# Patient Record
Sex: MALE | Race: WHITE | Employment: UNEMPLOYED | RURAL
[De-identification: names, ages, dates, MRNs, and addresses within clinical notes are randomized per-mention and may not be internally consistent; named-entity substitution may affect disease eponyms.]

---

## 2018-10-09 ENCOUNTER — APPOINTMENT (OUTPATIENT)
Dept: CT IMAGING | Facility: HOSPITAL | Age: 53
End: 2018-10-09
Payer: COMMERCIAL

## 2018-10-09 ENCOUNTER — HOSPITAL ENCOUNTER (EMERGENCY)
Facility: HOSPITAL | Age: 53
Discharge: HOME | End: 2018-10-09
Attending: EMERGENCY MEDICINE
Payer: COMMERCIAL

## 2018-10-09 VITALS
OXYGEN SATURATION: 96 % | HEIGHT: 71 IN | TEMPERATURE: 98.1 F | BODY MASS INDEX: 26.6 KG/M2 | SYSTOLIC BLOOD PRESSURE: 143 MMHG | DIASTOLIC BLOOD PRESSURE: 98 MMHG | RESPIRATION RATE: 16 BRPM | HEART RATE: 63 BPM | WEIGHT: 190 LBS

## 2018-10-09 DIAGNOSIS — R13.10 DYSPHAGIA, UNSPECIFIED TYPE: Primary | ICD-10-CM

## 2018-10-09 PROCEDURE — 70490 CT SOFT TISSUE NECK W/O DYE: CPT

## 2018-10-09 PROCEDURE — 99283 EMERGENCY DEPT VISIT LOW MDM: CPT

## 2018-10-09 RX ORDER — PROPRANOLOL HYDROCHLORIDE 80 MG/1
80 TABLET ORAL 2 TIMES DAILY
COMMUNITY

## 2018-10-09 RX ORDER — HYDROCHLOROTHIAZIDE 12.5 MG/1
12.5 TABLET ORAL DAILY
COMMUNITY

## 2018-10-09 RX ORDER — FAMOTIDINE 40 MG/1
40 TABLET, FILM COATED ORAL DAILY PRN
COMMUNITY

## 2018-10-09 ASSESSMENT — ENCOUNTER SYMPTOMS
TROUBLE SWALLOWING: 0
EYE REDNESS: 0
BACK PAIN: 0
CHOKING: 1
DIARRHEA: 0
NAUSEA: 0
COUGH: 0
ABDOMINAL PAIN: 0
WHEEZING: 0
RHINORRHEA: 0
CONSTIPATION: 0
VOMITING: 0
EYE DISCHARGE: 0
SORE THROAT: 0
SINUS PRESSURE: 0
CHEST TIGHTNESS: 0
EYE PAIN: 0
SHORTNESS OF BREATH: 0

## 2018-10-09 ASSESSMENT — PAIN DESCRIPTION - DESCRIPTORS: DESCRIPTORS: BURNING;OTHER (COMMENT)

## 2018-10-09 ASSESSMENT — PAIN DESCRIPTION - ONSET: ONSET: SUDDEN

## 2018-10-09 ASSESSMENT — PAIN DESCRIPTION - PROGRESSION: CLINICAL_PROGRESSION: NOT CHANGED

## 2018-10-09 ASSESSMENT — PAIN DESCRIPTION - PAIN TYPE: TYPE: ACUTE PAIN

## 2018-10-09 ASSESSMENT — PAIN SCALES - GENERAL
PAINLEVEL_OUTOF10: 0
PAINLEVEL_OUTOF10: 7

## 2018-10-09 ASSESSMENT — PAIN DESCRIPTION - LOCATION: LOCATION: THROAT;CHEST

## 2018-10-09 ASSESSMENT — PAIN DESCRIPTION - FREQUENCY: FREQUENCY: CONTINUOUS

## 2018-10-10 NOTE — ED PROVIDER NOTES
PM      PATIENT REFERRED TO:  [facility]  [facility]  [address]   [facility]  [phone]    If symptoms worsen      DISCHARGE MEDICATIONS:  New Prescriptions    No medications on file         (Please note that portions of this note may have been completed with a voice recognition program.  Efforts were made to edit the dictations but occasionally words are mis-transcribed.)    Omar Mena MD (electronically signed)  Attending Emergency Physician        Renuka Bustamante MD  10/09/18 [id number]

## 2019-04-04 ENCOUNTER — LAB (OUTPATIENT)
Dept: LAB | Facility: HOSPITAL | Age: 54
End: 2019-04-04

## 2019-04-04 ENCOUNTER — TRANSCRIBE ORDERS (OUTPATIENT)
Dept: ADMINISTRATIVE | Facility: HOSPITAL | Age: 54
End: 2019-04-04

## 2019-04-04 DIAGNOSIS — K74.60 HEPATIC CIRRHOSIS, UNSPECIFIED HEPATIC CIRRHOSIS TYPE, UNSPECIFIED WHETHER ASCITES PRESENT (HCC): ICD-10-CM

## 2019-04-04 DIAGNOSIS — K74.60 HEPATIC CIRRHOSIS, UNSPECIFIED HEPATIC CIRRHOSIS TYPE, UNSPECIFIED WHETHER ASCITES PRESENT (HCC): Primary | ICD-10-CM

## 2019-04-04 LAB — AMMONIA BLD-SCNC: 58 UMOL/L (ref 16–60)

## 2019-04-04 PROCEDURE — 36415 COLL VENOUS BLD VENIPUNCTURE: CPT

## 2019-04-04 PROCEDURE — 82140 ASSAY OF AMMONIA: CPT

## 2019-04-07 ENCOUNTER — OFFICE VISIT (OUTPATIENT)
Dept: RETAIL CLINIC | Facility: CLINIC | Age: 54
End: 2019-04-07

## 2019-04-07 VITALS
HEART RATE: 77 BPM | TEMPERATURE: 98.2 F | HEIGHT: 71 IN | WEIGHT: 199 LBS | BODY MASS INDEX: 27.86 KG/M2 | OXYGEN SATURATION: 97 % | RESPIRATION RATE: 20 BRPM | SYSTOLIC BLOOD PRESSURE: 120 MMHG | DIASTOLIC BLOOD PRESSURE: 70 MMHG

## 2019-04-07 DIAGNOSIS — J06.9 ACUTE URI: Primary | ICD-10-CM

## 2019-04-07 LAB
EXPIRATION DATE: NORMAL
FLUAV AG NPH QL: NEGATIVE
FLUBV AG NPH QL: NEGATIVE
INTERNAL CONTROL: NORMAL
Lab: NORMAL

## 2019-04-07 PROCEDURE — 99203 OFFICE O/P NEW LOW 30 MIN: CPT | Performed by: NURSE PRACTITIONER

## 2019-04-07 PROCEDURE — 87804 INFLUENZA ASSAY W/OPTIC: CPT | Performed by: NURSE PRACTITIONER

## 2019-04-07 RX ORDER — ALBUTEROL SULFATE 90 UG/1
2 AEROSOL, METERED RESPIRATORY (INHALATION) EVERY 4 HOURS PRN
Qty: 1 INHALER | Refills: 0 | Status: SHIPPED | OUTPATIENT
Start: 2019-04-07 | End: 2019-05-07

## 2019-04-07 RX ORDER — AZITHROMYCIN 250 MG/1
TABLET, FILM COATED ORAL
Qty: 6 TABLET | Refills: 0 | Status: ON HOLD | OUTPATIENT
Start: 2019-04-07 | End: 2019-05-14

## 2019-04-07 RX ORDER — CLONIDINE HYDROCHLORIDE 0.1 MG/1
TABLET ORAL
Refills: 0 | Status: ON HOLD | COMMUNITY
Start: 2019-04-03 | End: 2019-05-14

## 2019-04-07 RX ORDER — TRIAMCINOLONE ACETONIDE 1 MG/G
CREAM TOPICAL
Refills: 3 | Status: ON HOLD | COMMUNITY
Start: 2019-04-01 | End: 2019-05-14

## 2019-04-07 RX ORDER — PANTOPRAZOLE SODIUM 20 MG/1
TABLET, DELAYED RELEASE ORAL
Refills: 0 | Status: ON HOLD | COMMUNITY
Start: 2019-04-01 | End: 2019-05-14

## 2019-04-07 RX ORDER — HYDROCHLOROTHIAZIDE 12.5 MG/1
CAPSULE, GELATIN COATED ORAL
Refills: 3 | Status: ON HOLD | COMMUNITY
Start: 2019-04-01 | End: 2019-05-14

## 2019-04-07 RX ORDER — PROPRANOLOL HYDROCHLORIDE 80 MG/1
TABLET ORAL
Refills: 3 | Status: ON HOLD | COMMUNITY
Start: 2019-04-01 | End: 2019-05-14

## 2019-04-07 RX ORDER — IBUPROFEN 600 MG/1
TABLET ORAL
Refills: 0 | Status: ON HOLD | COMMUNITY
Start: 2019-04-03 | End: 2019-05-14

## 2019-04-07 RX ORDER — METHYLPREDNISOLONE 4 MG/1
TABLET ORAL
Qty: 1 EACH | Refills: 0 | Status: SHIPPED | OUTPATIENT
Start: 2019-04-07 | End: 2019-04-12

## 2019-04-07 NOTE — PROGRESS NOTES
ROCÍO@  Brandon Dillon is a 53 y.o. male.   Chief Complaint   Patient presents with   • Flu Symptoms     3 days      URI    This is a new problem. The current episode started in the past 7 days (3 days). The problem has been waxing and waning. The maximum temperature recorded prior to his arrival was 103 - 104 F. The fever has been present for 1 to 2 days. Associated symptoms include congestion (head/chest), coughing (productive), headaches and wheezing. Pertinent negatives include no chest pain, ear pain, rash or sore throat. Treatments tried: Mucinex. The treatment provided mild relief.        Brandon Dillon  presents to [facility] with cc of Flu Like symptoms for 3 days. Reviewed the PMFSH. . See ROS.  The following portions of the patient's history were reviewed and updated as appropriate: allergies, current medications, past family history, past medical history, past social history, past surgical history and problem list.    Current Outpatient Medications:   •  albuterol sulfate  (90 Base) MCG/ACT inhaler, Inhale 2 puffs Every 4 (Four) Hours As Needed for Wheezing for up to 30 days., Disp: 1 inhaler, Rfl: 0  •  azithromycin (ZITHROMAX Z-FRANCISCA) 250 MG tablet, Take 2 tablets the first day, then 1 tablet daily for 4 days., Disp: 6 tablet, Rfl: 0  •  CloNIDine (CATAPRES) 0.1 MG tablet, TAKE ONE TABLET BY MOUTH EVERY DAY  HOLD DOSE IF SYSTOLIC BLOOD PRESSURE IS GREATER THAN 170 OR IF HEART RATE IS LESS THAN 60, Disp: , Rfl: 0  •  hydrochlorothiazide (MICROZIDE) 12.5 MG capsule, TAKE ONE CAPSULE BY MOUTH EVERY DAY AS DIRECTED, Disp: , Rfl: 3  •  ibuprofen (ADVIL,MOTRIN) 600 MG tablet, TAKE ONE TABLET BY MOUTH EVERY 12 HOURS FOR PAIN AND INFLAMATION, Disp: , Rfl: 0  •  methylPREDNISolone (MEDROL, FRANCISCA,) 4 MG tablet, Take as directed on package instructions., Disp: 1 each, Rfl: 0  •  pantoprazole (PROTONIX) 20 MG EC tablet, TAKE ONE TABLET BY MOUTH EVERY DAY FOR THE STOMACH, Disp: , Rfl: 0  •  propranolol (INDERAL) 80  "MG tablet, TAKE ONE TABLET BY MOUTH TWO TIMES A DAY AS DIRECTED, Disp: , Rfl: 3  •  triamcinolone (KENALOG) 0.1 % cream, APPLY TO AFFECTED AREA TWO TIMES A DAY AS DIRECTED, Disp: , Rfl: 3    Allergies   Allergen Reactions   • Sulfa Antibiotics Hives, Shortness Of Breath and Swelling       Review of Systems   Constitutional: Positive for fever. Negative for chills and fatigue.   HENT: Positive for congestion (head/chest). Negative for ear pain and sore throat.    Respiratory: Positive for cough (productive), chest tightness and wheezing.    Cardiovascular: Negative for chest pain.   Endocrine: Negative for cold intolerance.   Musculoskeletal: Negative for arthralgias.   Skin: Negative for rash.   Neurological: Positive for headaches.       Objective     Visit Vitals  /70   Pulse 77   Temp 98.2 °F (36.8 °C) (Temporal)   Resp 20   Ht 180.3 cm (71\")   Wt 90.3 kg (199 lb)   SpO2 97%   BMI 27.75 kg/m²         Physical Exam   Constitutional: He is oriented to person, place, and time. He appears well-developed and well-nourished. No distress.   HENT:   Head: Normocephalic and atraumatic.   Right Ear: Tympanic membrane, external ear and ear canal normal.   Left Ear: Tympanic membrane, external ear and ear canal normal.   Nose: Mucosal edema present. Right sinus exhibits no maxillary sinus tenderness and no frontal sinus tenderness. Left sinus exhibits no maxillary sinus tenderness and no frontal sinus tenderness.   Mouth/Throat: Uvula is midline and mucous membranes are normal. Posterior oropharyngeal erythema present. No oropharyngeal exudate. No tonsillar exudate.   Eyes: Conjunctivae and EOM are normal. Pupils are equal, round, and reactive to light.   Neck: Normal range of motion.   Cardiovascular: Normal rate, regular rhythm and normal heart sounds.   Pulmonary/Chest: Effort normal. No respiratory distress. He has wheezes (scatterd bilateral wheezing). He has no rales. He exhibits no tenderness.   Abdominal: Soft. " Bowel sounds are normal. He exhibits no distension. There is no tenderness.   Musculoskeletal: Normal range of motion. He exhibits no tenderness.   Lymphadenopathy:     He has no cervical adenopathy.   Neurological: He is alert and oriented to person, place, and time.   Skin: Skin is warm and dry.   Psychiatric: He has a normal mood and affect. His behavior is normal. Judgment and thought content normal.   Nursing note and vitals reviewed.      Lab Results (last 24 hours)     Procedure Component Value Units Date/Time    POC Influenza A / B [[id number]]  (Normal) Collected:  04/07/19 1406    Specimen:  Swab Updated:  04/07/19 1407     Rapid Influenza A Ag Negative     Rapid Influenza B Ag Negative     Internal Control Passed     Lot Number [id number]     Expiration Date 10/22/21          Assessment/Plan   Brandon was seen today for flu symptoms.    Diagnoses and all orders for this visit:    Acute URI  -     POC Influenza A / B  -     azithromycin (ZITHROMAX Z-FRANCISCA) 250 MG tablet; Take 2 tablets the first day, then 1 tablet daily for 4 days.  -     methylPREDNISolone (MEDROL, FRANCISCA,) 4 MG tablet; Take as directed on package instructions.  -     albuterol sulfate  (90 Base) MCG/ACT inhaler; Inhale 2 puffs Every 4 (Four) Hours As Needed for Wheezing for up to 30 days.

## 2019-04-07 NOTE — PATIENT INSTRUCTIONS

## 2019-05-01 NOTE — ED TRIAGE NOTES
Patient reports feels like a piece of hot dog is stuck in his throat since 1630 this afternoon. Patient reports has been unable to drink since that time. Also reports coughing up red tinged phlegm. lens makespherecylinderaxisaddBCDIAMVAInt VANVExaminer

## 2019-05-14 ENCOUNTER — HOSPITAL ENCOUNTER (INPATIENT)
Facility: HOSPITAL | Age: 54
LOS: 3 days | Discharge: HOME | End: 2019-05-17
Attending: PSYCHIATRY & NEUROLOGY | Admitting: PSYCHIATRY & NEUROLOGY

## 2019-05-14 ENCOUNTER — HOSPITAL ENCOUNTER (EMERGENCY)
Facility: HOSPITAL | Age: 54
Discharge: STILL ADMITTED OR ADMINISTRATIVE | End: 2019-05-14
Attending: EMERGENCY MEDICINE

## 2019-05-14 VITALS
SYSTOLIC BLOOD PRESSURE: 138 MMHG | HEART RATE: 70 BPM | BODY MASS INDEX: 27.02 KG/M2 | DIASTOLIC BLOOD PRESSURE: 88 MMHG | OXYGEN SATURATION: 99 % | WEIGHT: 193 LBS | RESPIRATION RATE: 18 BRPM | HEIGHT: 71 IN | TEMPERATURE: 98.4 F

## 2019-05-14 DIAGNOSIS — R45.851 SUICIDAL IDEATION: ICD-10-CM

## 2019-05-14 DIAGNOSIS — I10 HYPERTENSION, UNSPECIFIED TYPE: Primary | ICD-10-CM

## 2019-05-14 LAB
6-ACETYL MORPHINE: NEGATIVE
ALBUMIN SERPL-MCNC: 3.88 G/DL (ref 3.5–5.2)
ALBUMIN/GLOB SERPL: 1.1 G/DL
ALP SERPL-CCNC: 181 U/L (ref 39–117)
ALT SERPL W P-5'-P-CCNC: 174 U/L (ref 1–41)
AMPHET+METHAMPHET UR QL: NEGATIVE
ANION GAP SERPL CALCULATED.3IONS-SCNC: 10.1 MMOL/L
AST SERPL-CCNC: 171 U/L (ref 1–40)
BARBITURATES UR QL SCN: NEGATIVE
BASOPHILS # BLD AUTO: 0.08 10*3/MM3 (ref 0–0.2)
BASOPHILS NFR BLD AUTO: 1.6 % (ref 0–1.5)
BENZODIAZ UR QL SCN: NEGATIVE
BILIRUB SERPL-MCNC: 0.8 MG/DL (ref 0.2–1.2)
BILIRUB UR QL STRIP: NEGATIVE
BUN BLD-MCNC: 12 MG/DL (ref 6–20)
BUN/CREAT SERPL: 17.1 (ref 7–25)
BUPRENORPHINE SERPL-MCNC: NEGATIVE NG/ML
CALCIUM SPEC-SCNC: 9.6 MG/DL (ref 8.6–10.5)
CANNABINOIDS SERPL QL: NEGATIVE
CHLORIDE SERPL-SCNC: 104 MMOL/L (ref 98–107)
CLARITY UR: CLEAR
CO2 SERPL-SCNC: 25.9 MMOL/L (ref 22–29)
COCAINE UR QL: NEGATIVE
COLOR UR: YELLOW
CREAT BLD-MCNC: 0.7 MG/DL (ref 0.76–1.27)
DEPRECATED RDW RBC AUTO: 43.6 FL (ref 37–54)
EOSINOPHIL # BLD AUTO: 0.46 10*3/MM3 (ref 0–0.4)
EOSINOPHIL NFR BLD AUTO: 8.9 % (ref 0.3–6.2)
ERYTHROCYTE [DISTWIDTH] IN BLOOD BY AUTOMATED COUNT: 13.4 % (ref 12.3–15.4)
ETHANOL BLD-MCNC: <10 MG/DL (ref 0–10)
ETHANOL UR QL: <0.01 %
GFR SERPL CREATININE-BSD FRML MDRD: 119 ML/MIN/1.73
GLOBULIN UR ELPH-MCNC: 3.5 GM/DL
GLUCOSE BLD-MCNC: 108 MG/DL (ref 65–99)
GLUCOSE UR STRIP-MCNC: NEGATIVE MG/DL
HCT VFR BLD AUTO: 42.5 % (ref 37.5–51)
HGB BLD-MCNC: 14.9 G/DL (ref 13–17.7)
HGB UR QL STRIP.AUTO: NEGATIVE
IMM GRANULOCYTES # BLD AUTO: 0.01 10*3/MM3 (ref 0–0.05)
IMM GRANULOCYTES NFR BLD AUTO: 0.2 % (ref 0–0.5)
KETONES UR QL STRIP: NEGATIVE
LEUKOCYTE ESTERASE UR QL STRIP.AUTO: NEGATIVE
LYMPHOCYTES # BLD AUTO: 1.23 10*3/MM3 (ref 0.7–3.1)
LYMPHOCYTES NFR BLD AUTO: 23.9 % (ref 19.6–45.3)
MAGNESIUM SERPL-MCNC: 1.6 MG/DL (ref 1.6–2.6)
MCH RBC QN AUTO: 31.8 PG (ref 26.6–33)
MCHC RBC AUTO-ENTMCNC: 35.1 G/DL (ref 31.5–35.7)
MCV RBC AUTO: 90.6 FL (ref 79–97)
METHADONE UR QL SCN: NEGATIVE
MONOCYTES # BLD AUTO: 0.66 10*3/MM3 (ref 0.1–0.9)
MONOCYTES NFR BLD AUTO: 12.8 % (ref 5–12)
NEUTROPHILS # BLD AUTO: 2.71 10*3/MM3 (ref 1.7–7)
NEUTROPHILS NFR BLD AUTO: 52.6 % (ref 42.7–76)
NITRITE UR QL STRIP: NEGATIVE
OPIATES UR QL: NEGATIVE
OXYCODONE UR QL SCN: NEGATIVE
PCP UR QL SCN: NEGATIVE
PH UR STRIP.AUTO: 8 [PH] (ref 5–8)
PLATELET # BLD AUTO: 90 10*3/MM3 (ref 140–450)
PMV BLD AUTO: 11.8 FL (ref 6–12)
POTASSIUM BLD-SCNC: 3.6 MMOL/L (ref 3.5–5.2)
PROT SERPL-MCNC: 7.4 G/DL (ref 6–8.5)
PROT UR QL STRIP: NEGATIVE
RBC # BLD AUTO: 4.69 10*6/MM3 (ref 4.14–5.8)
SODIUM BLD-SCNC: 140 MMOL/L (ref 136–145)
SP GR UR STRIP: 1.01 (ref 1–1.03)
TROPONIN T SERPL-MCNC: <0.01 NG/ML (ref 0–0.03)
UROBILINOGEN UR QL STRIP: NORMAL
WBC NRBC COR # BLD: 5.15 10*3/MM3 (ref 3.4–10.8)

## 2019-05-14 PROCEDURE — 93005 ELECTROCARDIOGRAM TRACING: CPT | Performed by: PSYCHIATRY & NEUROLOGY

## 2019-05-14 PROCEDURE — 80307 DRUG TEST PRSMV CHEM ANLYZR: CPT | Performed by: PHYSICIAN ASSISTANT

## 2019-05-14 PROCEDURE — 93005 ELECTROCARDIOGRAM TRACING: CPT | Performed by: PHYSICIAN ASSISTANT

## 2019-05-14 PROCEDURE — 84484 ASSAY OF TROPONIN QUANT: CPT | Performed by: PHYSICIAN ASSISTANT

## 2019-05-14 PROCEDURE — 93010 ELECTROCARDIOGRAM REPORT: CPT | Performed by: INTERNAL MEDICINE

## 2019-05-14 PROCEDURE — 99285 EMERGENCY DEPT VISIT HI MDM: CPT

## 2019-05-14 PROCEDURE — 96374 THER/PROPH/DIAG INJ IV PUSH: CPT

## 2019-05-14 PROCEDURE — 80053 COMPREHEN METABOLIC PANEL: CPT | Performed by: PHYSICIAN ASSISTANT

## 2019-05-14 PROCEDURE — 83735 ASSAY OF MAGNESIUM: CPT | Performed by: PHYSICIAN ASSISTANT

## 2019-05-14 PROCEDURE — 81003 URINALYSIS AUTO W/O SCOPE: CPT | Performed by: PHYSICIAN ASSISTANT

## 2019-05-14 PROCEDURE — 85025 COMPLETE CBC W/AUTO DIFF WBC: CPT | Performed by: PHYSICIAN ASSISTANT

## 2019-05-14 RX ORDER — BENZTROPINE MESYLATE 1 MG/ML
0.5 INJECTION INTRAMUSCULAR; INTRAVENOUS DAILY PRN
Status: DISCONTINUED | OUTPATIENT
Start: 2019-05-14 | End: 2019-05-17

## 2019-05-14 RX ORDER — METOPROLOL TARTRATE 5 MG/5ML
5 INJECTION INTRAVENOUS ONCE
Status: COMPLETED | OUTPATIENT
Start: 2019-05-14 | End: 2019-05-14

## 2019-05-14 RX ORDER — PROPRANOLOL HYDROCHLORIDE 40 MG/1
80 TABLET ORAL 2 TIMES DAILY
Status: CANCELLED | OUTPATIENT
Start: 2019-05-14

## 2019-05-14 RX ORDER — IBUPROFEN 600 MG/1
600 TABLET ORAL EVERY 6 HOURS PRN
Status: DISCONTINUED | OUTPATIENT
Start: 2019-05-14 | End: 2019-05-17

## 2019-05-14 RX ORDER — SODIUM CHLORIDE 0.9 % (FLUSH) 0.9 %
10 SYRINGE (ML) INJECTION AS NEEDED
Status: DISCONTINUED | OUTPATIENT
Start: 2019-05-14 | End: 2019-05-14 | Stop reason: HOSPADM

## 2019-05-14 RX ORDER — IBUPROFEN 600 MG/1
600 TABLET ORAL EVERY 6 HOURS PRN
COMMUNITY
End: 2019-11-13

## 2019-05-14 RX ORDER — PROPRANOLOL HYDROCHLORIDE 40 MG/1
80 TABLET ORAL 2 TIMES DAILY
Status: DISCONTINUED | OUTPATIENT
Start: 2019-05-14 | End: 2019-05-17 | Stop reason: HOSPADM

## 2019-05-14 RX ORDER — ONDANSETRON 4 MG/1
4 TABLET, FILM COATED ORAL EVERY 6 HOURS PRN
Status: DISCONTINUED | OUTPATIENT
Start: 2019-05-14 | End: 2019-05-17 | Stop reason: HOSPADM

## 2019-05-14 RX ORDER — BENZTROPINE MESYLATE 1 MG/1
1 TABLET ORAL DAILY PRN
Status: DISCONTINUED | OUTPATIENT
Start: 2019-05-14 | End: 2019-05-17

## 2019-05-14 RX ORDER — ALUMINA, MAGNESIA, AND SIMETHICONE 2400; 2400; 240 MG/30ML; MG/30ML; MG/30ML
15 SUSPENSION ORAL EVERY 6 HOURS PRN
Status: DISCONTINUED | OUTPATIENT
Start: 2019-05-14 | End: 2019-05-17 | Stop reason: HOSPADM

## 2019-05-14 RX ORDER — PANTOPRAZOLE SODIUM 20 MG/1
20 TABLET, DELAYED RELEASE ORAL DAILY
Status: CANCELLED | OUTPATIENT
Start: 2019-05-14

## 2019-05-14 RX ORDER — BUPROPION HYDROCHLORIDE 150 MG/1
150 TABLET, EXTENDED RELEASE ORAL 2 TIMES DAILY
COMMUNITY
End: 2019-05-17 | Stop reason: HOSPADM

## 2019-05-14 RX ORDER — HYDROXYZINE 50 MG/1
50 TABLET, FILM COATED ORAL EVERY 6 HOURS PRN
Status: DISCONTINUED | OUTPATIENT
Start: 2019-05-14 | End: 2019-05-17 | Stop reason: HOSPADM

## 2019-05-14 RX ORDER — ACETAMINOPHEN 325 MG/1
650 TABLET ORAL EVERY 4 HOURS PRN
Status: DISCONTINUED | OUTPATIENT
Start: 2019-05-14 | End: 2019-05-17

## 2019-05-14 RX ORDER — PANTOPRAZOLE SODIUM 20 MG/1
20 TABLET, DELAYED RELEASE ORAL DAILY
Status: ON HOLD | COMMUNITY
End: 2020-06-08 | Stop reason: SDUPTHER

## 2019-05-14 RX ORDER — CLONIDINE HYDROCHLORIDE 0.1 MG/1
0.1 TABLET ORAL DAILY
Status: CANCELLED | OUTPATIENT
Start: 2019-05-14

## 2019-05-14 RX ORDER — BUPROPION HYDROCHLORIDE 150 MG/1
150 TABLET, EXTENDED RELEASE ORAL EVERY 12 HOURS SCHEDULED
Status: DISCONTINUED | OUTPATIENT
Start: 2019-05-14 | End: 2019-05-15

## 2019-05-14 RX ORDER — HYDROCHLOROTHIAZIDE 25 MG/1
12.5 TABLET ORAL DAILY
Status: DISCONTINUED | OUTPATIENT
Start: 2019-05-14 | End: 2019-05-17 | Stop reason: HOSPADM

## 2019-05-14 RX ORDER — LOPERAMIDE HYDROCHLORIDE 2 MG/1
2 CAPSULE ORAL
Status: DISCONTINUED | OUTPATIENT
Start: 2019-05-14 | End: 2019-05-17 | Stop reason: HOSPADM

## 2019-05-14 RX ORDER — HYDROCHLOROTHIAZIDE 25 MG/1
12.5 TABLET ORAL DAILY
Status: CANCELLED | OUTPATIENT
Start: 2019-05-14

## 2019-05-14 RX ORDER — BENZONATATE 100 MG/1
100 CAPSULE ORAL 3 TIMES DAILY PRN
Status: DISCONTINUED | OUTPATIENT
Start: 2019-05-14 | End: 2019-05-17 | Stop reason: HOSPADM

## 2019-05-14 RX ORDER — ECHINACEA PURPUREA EXTRACT 125 MG
2 TABLET ORAL AS NEEDED
Status: DISCONTINUED | OUTPATIENT
Start: 2019-05-14 | End: 2019-05-17 | Stop reason: HOSPADM

## 2019-05-14 RX ORDER — FAMOTIDINE 20 MG/1
20 TABLET, FILM COATED ORAL 2 TIMES DAILY PRN
Status: DISCONTINUED | OUTPATIENT
Start: 2019-05-14 | End: 2019-05-17 | Stop reason: HOSPADM

## 2019-05-14 RX ORDER — CLONIDINE HYDROCHLORIDE 0.1 MG/1
0.1 TABLET ORAL DAILY
Status: DISCONTINUED | OUTPATIENT
Start: 2019-05-15 | End: 2019-05-16

## 2019-05-14 RX ORDER — BUPROPION HYDROCHLORIDE 150 MG/1
150 TABLET, EXTENDED RELEASE ORAL EVERY 12 HOURS SCHEDULED
Status: CANCELLED | OUTPATIENT
Start: 2019-05-14

## 2019-05-14 RX ORDER — PANTOPRAZOLE SODIUM 40 MG/1
40 TABLET, DELAYED RELEASE ORAL DAILY
Status: DISCONTINUED | OUTPATIENT
Start: 2019-05-14 | End: 2019-05-17 | Stop reason: HOSPADM

## 2019-05-14 RX ORDER — TRAZODONE HYDROCHLORIDE 50 MG/1
50 TABLET ORAL NIGHTLY PRN
Status: DISCONTINUED | OUTPATIENT
Start: 2019-05-14 | End: 2019-05-17 | Stop reason: HOSPADM

## 2019-05-14 RX ORDER — PROPRANOLOL HYDROCHLORIDE 80 MG/1
80 TABLET ORAL 2 TIMES DAILY
Status: ON HOLD | COMMUNITY
End: 2020-06-08 | Stop reason: SDUPTHER

## 2019-05-14 RX ORDER — HYDROCHLOROTHIAZIDE 12.5 MG/1
12.5 TABLET ORAL DAILY
COMMUNITY
End: 2020-05-19 | Stop reason: ALTCHOICE

## 2019-05-14 RX ORDER — IBUPROFEN 600 MG/1
600 TABLET ORAL EVERY 6 HOURS PRN
Status: CANCELLED | OUTPATIENT
Start: 2019-05-14

## 2019-05-14 RX ORDER — CLONIDINE HYDROCHLORIDE 0.1 MG/1
0.1 TABLET ORAL DAILY
COMMUNITY
End: 2019-05-17 | Stop reason: HOSPADM

## 2019-05-14 RX ADMIN — BUPROPION HYDROCHLORIDE 150 MG: 150 TABLET, EXTENDED RELEASE ORAL at 20:53

## 2019-05-14 RX ADMIN — HYDROCHLOROTHIAZIDE 12.5 MG: 25 TABLET ORAL at 17:44

## 2019-05-14 RX ADMIN — PANTOPRAZOLE SODIUM 40 MG: 40 TABLET, DELAYED RELEASE ORAL at 17:44

## 2019-05-14 RX ADMIN — SODIUM CHLORIDE 1000 ML: 9 INJECTION, SOLUTION INTRAVENOUS at 11:43

## 2019-05-14 RX ADMIN — HYDROXYZINE HYDROCHLORIDE 50 MG: 50 TABLET ORAL at 21:20

## 2019-05-14 RX ADMIN — PROPRANOLOL HYDROCHLORIDE 80 MG: 40 TABLET ORAL at 20:53

## 2019-05-14 RX ADMIN — METOPROLOL TARTRATE 5 MG: 5 INJECTION, SOLUTION INTRAVENOUS at 11:59

## 2019-05-14 NOTE — ED NOTES
"Pt has currently stated that he is waiting to hear if he currently has liver cancer, states he is no longer able to take anxiety medication because no one will give him anything and that right now he feels like \"popping a handful to end it all\" pt asked about suicidal ideations pt states he does think about it. Pt states \"I would have half a mind to end it if I do have liver cancer, everybody knows that's a death sentence.\" pt states he doesn't know if he would ever follow through with it but that's the way he feels.     Yvonne Hunt, RN  05/14/19 2076    "

## 2019-05-14 NOTE — ED NOTES
Sitter has arrived, she will taking watch at this time. Pt has remained calmed.     Shaila Hernandez  05/14/19 [id number]

## 2019-05-14 NOTE — NURSING NOTE
"Presented to ED r/t increased anxiety, depression, and blood pressure.  During medical assessment, disclosed that he has been having suicidal thoughts.  States that he was diagnosed with cirrhosis and 2003, and fears that he has liver cancer.  States that has led to suicidal thoughts.  Reports hx of substance abuse.  States that he has been clean since May 2012, when he was arrested and went to assisted.  Was released in January 2019.  Reports hx of suicide attempt \"years ago\" by taking \"a bunch of pills.\"  Hx of inpatient admission in 2007.  No current psychiatrist or therapist.  Is prescribed medication by PCP.    "

## 2019-05-14 NOTE — ED NOTES
Pt medically cleared for intake per provider. Pt changed out of gown into blue scrubs. Pt belongings sheet filled out and signed. IV removed at this time. Sitter walking pt over to intake     Yvonne Hunt, RN  05/14/19 [id number]

## 2019-05-14 NOTE — NURSING NOTE
"Intake assessment completed. The pt reports he came to the ED because of increased anxiety and depression that have led to high blood pressure. During pts medical clearance he verbalized suicidal ideation to ED provider. At this time he does endorse SI , but did not disclose a specific plan. Reports he was dx with liver cirrhosis and has a scope next week to determine if it is cancer. Pt states he has decided \"not to let the cancer get me, Im gonna get myself first.\"  Pt states he had 2 prior admissions in 2007 when suffering from addiction. He current denies substance abuse, he denies Hi, or avh. Reports that at times he sees shadows or spots out of the corner of his eyes, \"that seems to be when my blood pressure goes up\". Rates depression 10/10 anxiety 10/10  "

## 2019-05-14 NOTE — NURSING NOTE
Presented clinicals to Dr Martin. She would like to admit the pt but cannot until his BP <160 and <90. Carefully discussed abnormal lab values specifically ast/ alt/ platelets and current vital signs. RBTOx2.

## 2019-05-14 NOTE — ED PROVIDER NOTES
Subjective   This is a 53-year-old male who comes in with chief complaint hypertension, anxiety, suicidal ideation.  Patient states his blood pressures been up around 160/100 for the past several days.  Does state he is take 3 different blood pressure medications but is not been able to control it.  Patient denies any associated headache, vision problems, weakness, chest pain.  Patient also complains of suicidal ideation.  Does state that he would overdose.  He states he has been depressed due to having liver issues and told that he may have liver cancer.  Patient denies any other associated medical problems at this time.        History provided by:  Patient   used: No    Hypertension   Severity:  Moderate  Onset quality:  Sudden  Duration:  1 day  Timing:  Intermittent  Progression:  Worsening  Chronicity:  New  Time since last dose of antihypertensive:  1 day  Notable PTA blood pressures:  160/100  Context: normal sodium, not caffeine, not herbal remedies, not medication change, not OTC medications used and not stress    Relieved by:  Nothing  Worsened by:  Nothing  Ineffective treatments:  None tried  Associated symptoms: no abdominal pain, no anxiety, no blurred vision, no chest pain, no confusion, no dizziness, no fatigue, no fever, no headaches, no hematuria, no hypokalemia, no loss of consciousness, no nausea, no palpitations, no peripheral edema, no shortness of breath, no syncope, no tinnitus and no weakness    Risk factors: no alcohol use, no cardiac disease, no cocaine use, no diabetes, no family history of hypertension, no kidney disease, no prior aneurysm, no prior stroke and no PVD    Mental Health Problem   Presenting symptoms: aggressive behavior, suicidal thoughts and suicidal threats    Patient accompanied by:  Caregiver  Degree of incapacity (severity):  Moderate  Onset quality:  Sudden  Duration:  1 day  Timing:  Intermittent  Progression:  Worsening  Chronicity:   New  Context: not alcohol use, not drug abuse, not noncompliant and not recent medication change    Treatment compliance:  Untreated  Time since last psychoactive medication taken:  1 day  Relieved by:  Nothing  Worsened by:  Nothing  Ineffective treatments:  None tried  Associated symptoms: no abdominal pain, no anxiety, no chest pain, no fatigue, no feelings of worthlessness, no headaches, no hypersomnia, no insomnia, no irritability and no poor judgment    Risk factors: no neurological disease        Review of Systems   Constitutional: Negative.  Negative for fatigue, fever and irritability.   HENT: Negative for tinnitus.    Eyes: Negative.  Negative for blurred vision.   Respiratory: Negative for shortness of breath.    Cardiovascular: Negative for chest pain, palpitations and syncope.   Gastrointestinal: Negative for abdominal pain and nausea.   Genitourinary: Negative for hematuria.   Neurological: Negative for dizziness, loss of consciousness, weakness and headaches.   Psychiatric/Behavioral: Positive for suicidal ideas. Negative for confusion. The patient is not nervous/anxious and does not have insomnia.    All other systems reviewed and are negative.      Past Medical History:   Diagnosis Date   • Acid reflux    • Allergic    • Anxiety    • Asthma     as child   • Depression    • Hypertension    • Liver disease    • Migraines    • Pneumonia    • Sinusitis        Allergies   Allergen Reactions   • Sulfa Antibiotics Hives, Shortness Of Breath and Swelling       No past surgical history on file.    Family History   Problem Relation Age of Onset   • Dementia Mother    • Heart disease Father        Social History     Socioeconomic History   • Marital status:      Spouse name: Not on file   • Number of children: Not on file   • Years of education: Not on file   • Highest education level: Not on file   Tobacco Use   • Smoking status: Former Smoker     Packs/day: 0.50     Years: 2.00     Pack years: 1.00      Last attempt to quit: 2012     Years since quittin.3   • Smokeless tobacco: Current User     Types: Snuff   • Tobacco comment: wife smokes   Substance and Sexual Activity   • Alcohol use: No     Frequency: Never   • Drug use: No   • Sexual activity: Yes     Comment: unemployed,  has 2 sons/2 daughters           Objective   Physical Exam   Constitutional: He is oriented to person, place, and time. He appears well-developed and well-nourished. No distress.   HENT:   Head: Normocephalic and atraumatic.   Right Ear: External ear normal.   Left Ear: External ear normal.   Nose: Nose normal.   Mouth/Throat: Oropharynx is clear and moist. No oropharyngeal exudate.   Eyes: Conjunctivae and EOM are normal. Pupils are equal, round, and reactive to light. Right eye exhibits no discharge. Left eye exhibits no discharge. No scleral icterus.   Neck: Normal range of motion. Neck supple. No JVD present. No tracheal deviation present. No thyromegaly present.   Cardiovascular: Normal rate, regular rhythm, normal heart sounds and intact distal pulses. Exam reveals no gallop and no friction rub.   No murmur heard.  Pulmonary/Chest: Effort normal and breath sounds normal. No stridor. No respiratory distress. He has no wheezes. He has no rales. He exhibits no tenderness.   Abdominal: Soft. Bowel sounds are normal. He exhibits no distension and no mass. There is no tenderness. There is no rebound and no guarding. No hernia.   Musculoskeletal: Normal range of motion. He exhibits no edema, tenderness or deformity.   Lymphadenopathy:     He has no cervical adenopathy.   Neurological: He is alert and oriented to person, place, and time. He displays normal reflexes. No cranial nerve deficit or sensory deficit. He exhibits normal muscle tone. Coordination normal.   Skin: Skin is warm and dry. Capillary refill takes less than 2 seconds. No rash noted. He is not diaphoretic. No erythema. No pallor.   Psychiatric: His behavior is  normal. Judgment and thought content normal. His mood appears anxious.   Nursing note and vitals reviewed.      Procedures           ED Course  ED Course as of May 14 1518   Tue May 14, 2019   1332 Medically cleared for intake.  []      ED Course User Index  [] Gagan Blood PA-C                  [facility]      Final diagnoses:   Hypertension, unspecified type   Suicidal ideation            Gagan Blood PA-C  05/14/19 1510

## 2019-05-15 RX ORDER — BUPROPION HYDROCHLORIDE 150 MG/1
150 TABLET, EXTENDED RELEASE ORAL DAILY
Status: DISCONTINUED | OUTPATIENT
Start: 2019-05-16 | End: 2019-05-16

## 2019-05-15 RX ORDER — ESCITALOPRAM OXALATE 10 MG/1
5 TABLET ORAL DAILY
Status: DISCONTINUED | OUTPATIENT
Start: 2019-05-15 | End: 2019-05-16

## 2019-05-15 RX ORDER — BUSPIRONE HYDROCHLORIDE 5 MG/1
5 TABLET ORAL EVERY 8 HOURS SCHEDULED
Status: DISCONTINUED | OUTPATIENT
Start: 2019-05-15 | End: 2019-05-16

## 2019-05-15 RX ADMIN — HYDROCHLOROTHIAZIDE 12.5 MG: 25 TABLET ORAL at 08:41

## 2019-05-15 RX ADMIN — NICOTINE POLACRILEX 2 MG: 2 GUM, CHEWING BUCCAL at 20:29

## 2019-05-15 RX ADMIN — NICOTINE POLACRILEX 2 MG: 2 GUM, CHEWING BUCCAL at 22:22

## 2019-05-15 RX ADMIN — CLONIDINE HYDROCHLORIDE 0.1 MG: 0.1 TABLET ORAL at 08:41

## 2019-05-15 RX ADMIN — BUSPIRONE HYDROCHLORIDE 5 MG: 5 TABLET ORAL at 13:23

## 2019-05-15 RX ADMIN — ESCITALOPRAM OXALATE 5 MG: 10 TABLET, FILM COATED ORAL at 13:23

## 2019-05-15 RX ADMIN — PROPRANOLOL HYDROCHLORIDE 80 MG: 40 TABLET ORAL at 08:41

## 2019-05-15 RX ADMIN — PANTOPRAZOLE SODIUM 40 MG: 40 TABLET, DELAYED RELEASE ORAL at 08:41

## 2019-05-15 RX ADMIN — BUPROPION HYDROCHLORIDE 150 MG: 150 TABLET, EXTENDED RELEASE ORAL at 08:41

## 2019-05-15 RX ADMIN — BUSPIRONE HYDROCHLORIDE 5 MG: 5 TABLET ORAL at 21:09

## 2019-05-15 RX ADMIN — PROPRANOLOL HYDROCHLORIDE 80 MG: 40 TABLET ORAL at 20:29

## 2019-05-15 RX ADMIN — NICOTINE POLACRILEX 2 MG: 2 GUM, CHEWING BUCCAL at 13:23

## 2019-05-15 RX ADMIN — HYDROXYZINE HYDROCHLORIDE 50 MG: 50 TABLET ORAL at 21:09

## 2019-05-15 RX ADMIN — NICOTINE POLACRILEX 2 MG: 2 GUM, CHEWING BUCCAL at 16:09
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
## 2019-05-15 NOTE — PLAN OF CARE
Problem: Patient Care Overview  Goal: Plan of Care Review  Outcome: Ongoing (interventions implemented as appropriate)  PATIENT CONDITION STABLE.   05/15/19 1419   Coping/Psychosocial   Plan of Care Reviewed With patient   Coping/Psychosocial   Patient Agreement with Plan of Care agrees   Plan of Care Review   Progress no change       Problem: Overarching Goals (Adult)  Goal: Adheres to Safety Considerations for Self and Others  Outcome: Ongoing (interventions implemented as appropriate)    Goal: Optimized Coping Skills in Response to Life Stressors  Outcome: Ongoing (interventions implemented as appropriate)    Goal: Develops/Participates in Therapeutic [place] to Support Successful Transition  Outcome: Ongoing (interventions implemented as appropriate)      Problem: Hypertensive Disease/Crisis (Arterial) (Adult)  Goal: Signs and Symptoms of Listed Potential Problems Will be Absent, Minimized or Managed (Hypertensive Disease/Crisis)  Outcome: Ongoing (interventions implemented as appropriate)
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
## 2019-05-15 NOTE — PLAN OF CARE
Problem: Patient Care Overview  Goal: Plan of Care Review  Outcome: Ongoing (interventions implemented as appropriate)   05/14/19 7521   Coping/Psychosocial   Plan of Care Reviewed With patient   Coping/Psychosocial   Patient Agreement with Plan of Care agrees   Plan of Care Review   Progress no change   OTHER   Outcome Summary Patient rates anxiety 5/10 and depression 6/10. Patient denies si, hi, avh.        Problem: Overarching Goals (Adult)  Goal: Adheres to Safety Considerations for Self and Others  Outcome: Ongoing (interventions implemented as appropriate)    Goal: Optimized Coping Skills in Response to Life Stressors  Outcome: Ongoing (interventions implemented as appropriate)    Goal: Develops/Participates in Therapeutic [place] to Support Successful Transition  Outcome: Ongoing (interventions implemented as appropriate)

## 2019-05-15 NOTE — PLAN OF CARE
Problem: Patient Care Overview  Goal: Plan of Care Review  Outcome: Ongoing (interventions implemented as appropriate)   05/15/19 1435   Coping/Psychosocial   Plan of Care Reviewed With patient   Coping/Psychosocial   Patient Agreement with Plan of Care agrees   Plan of Care Review   Progress no change   OTHER   Outcome Summary Completed initial assessment, discussed alternative aftercare resources and expectations of treatment; reviewed treatment plan.   Coping/Psychosocial   Consent Given to Review Plan with Sister Griselda.     Goal: Individualization and Mutuality  Outcome: Ongoing (interventions implemented as appropriate)   05/15/19 1435   Personal Strengths/Vulnerabilities   Patient Personal Strengths expressive of emotions;expressive of needs;family/social support;motivated for treatment;motivated for recovery;resilient   Patient Vulnerabilities Ineffective coping skills, poor insight.   Individualization   Patient Specific Preferences Mood stabilization.   Patient Specific Goals (Include Timeframe) Patient to identify 3 healthy coping skills, deny all SI, HI, and AVH prior to discharge.   Patient Specific Interventions Patient to access psychiatric evaluation, medication management, individual and group CBT therapy sessions.   Mutuality/Individual Preferences   What Anxieties, Fears, Concerns, or Questions Do You Have About Your Care? None    What Information Would Help Us Give You More Personalized Care? None     Goal: Discharge Needs Assessment  Outcome: Ongoing (interventions implemented as appropriate)   05/15/19 1435   Discharge Needs Assessment   Readmission Within the Last 30 Days no previous admission in last 30 days   Concerns to be Addressed compliance issue;coping/stress;decision making;discharge planning;suicidal;substance/tobacco abuse/use;mental health   Patient/Family Anticipates Transition to home   Patient/Family Anticipated Services at Transition mental health services;outpatient care    Transportation Concerns car, none   Transportation Anticipated family or friend will provide   Patient's Choice of Community Agency(s) Anticipate [facility] referral.   Current Discharge Risk psychiatric illness;substance use/abuse;financial support inadequate;lack of support system/caregiver   Discharge Coordination/Progress Patient anticipated to return home and have aftercare scheduled with [facility] upon discharge. Patient has insurance for medications.   Discharge Needs Assessment,    Outpatient/Agency/Support Group Needs outpatient counseling;outpatient medication management;outpatient psychiatric care (specify)   Anticipated Discharge Disposition home or self-care     Goal: Interprofessional Rounds/Family Conf  Outcome: Ongoing (interventions implemented as appropriate)   05/15/19 1435   Interdisciplinary Rounds/Family Conf   Summary Therapist to staff patient's case with treatment team during admission.   Interdisciplinary Rounds/Family Conf   Participants nursing;psychiatrist;social work;other (see comments)  (Navigator.)

## 2019-05-16 RX ORDER — ESCITALOPRAM OXALATE 10 MG/1
10 TABLET ORAL DAILY
Status: DISCONTINUED | OUTPATIENT
Start: 2019-05-17 | End: 2019-05-17

## 2019-05-16 RX ORDER — ALBUTEROL SULFATE 90 UG/1
2 AEROSOL, METERED RESPIRATORY (INHALATION) EVERY 4 HOURS PRN
Status: ON HOLD | COMMUNITY
End: 2020-06-03

## 2019-05-16 RX ORDER — ALBUTEROL SULFATE 90 UG/1
2 AEROSOL, METERED RESPIRATORY (INHALATION) EVERY 4 HOURS PRN
Status: DISCONTINUED | OUTPATIENT
Start: 2019-05-16 | End: 2019-05-17 | Stop reason: HOSPADM

## 2019-05-16 RX ORDER — BUSPIRONE HYDROCHLORIDE 10 MG/1
10 TABLET ORAL EVERY 8 HOURS SCHEDULED
Status: DISCONTINUED | OUTPATIENT
Start: 2019-05-16 | End: 2019-05-17 | Stop reason: HOSPADM

## 2019-05-16 RX ORDER — CLONIDINE HYDROCHLORIDE 0.1 MG/1
0.1 TABLET ORAL EVERY 12 HOURS SCHEDULED
Status: DISCONTINUED | OUTPATIENT
Start: 2019-05-16 | End: 2019-05-17 | Stop reason: HOSPADM

## 2019-05-16 RX ADMIN — PROPRANOLOL HYDROCHLORIDE 80 MG: 40 TABLET ORAL at 08:12

## 2019-05-16 RX ADMIN — CLONIDINE HYDROCHLORIDE 0.1 MG: 0.1 TABLET ORAL at 08:12

## 2019-05-16 RX ADMIN — HYDROCHLOROTHIAZIDE 12.5 MG: 25 TABLET ORAL at 08:12

## 2019-05-16 RX ADMIN — CLONIDINE HYDROCHLORIDE 0.1 MG: 0.1 TABLET ORAL at 20:17

## 2019-05-16 RX ADMIN — NICOTINE POLACRILEX 2 MG: 2 GUM, CHEWING BUCCAL at 19:12

## 2019-05-16 RX ADMIN — NICOTINE POLACRILEX 2 MG: 2 GUM, CHEWING BUCCAL at 10:17

## 2019-05-16 RX ADMIN — PROPRANOLOL HYDROCHLORIDE 80 MG: 40 TABLET ORAL at 20:17

## 2019-05-16 RX ADMIN — HYDROXYZINE HYDROCHLORIDE 50 MG: 50 TABLET ORAL at 20:17

## 2019-05-16 RX ADMIN — NICOTINE POLACRILEX 2 MG: 2 GUM, CHEWING BUCCAL at 08:14

## 2019-05-16 RX ADMIN — BUSPIRONE HYDROCHLORIDE 10 MG: 10 TABLET ORAL at 21:09

## 2019-05-16 RX ADMIN — NICOTINE POLACRILEX 2 MG: 2 GUM, CHEWING BUCCAL at 21:10

## 2019-05-16 RX ADMIN — NICOTINE POLACRILEX 2 MG: 2 GUM, CHEWING BUCCAL at 14:01

## 2019-05-16 RX ADMIN — ESCITALOPRAM OXALATE 5 MG: 10 TABLET, FILM COATED ORAL at 08:11

## 2019-05-16 RX ADMIN — BUPROPION HYDROCHLORIDE 150 MG: 150 TABLET, EXTENDED RELEASE ORAL at 08:12

## 2019-05-16 RX ADMIN — PANTOPRAZOLE SODIUM 40 MG: 40 TABLET, DELAYED RELEASE ORAL at 08:12

## 2019-05-16 RX ADMIN — BUSPIRONE HYDROCHLORIDE 5 MG: 5 TABLET ORAL at 06:23

## 2019-05-16 RX ADMIN — BUSPIRONE HYDROCHLORIDE 10 MG: 10 TABLET ORAL at 14:01

## 2019-05-16 NOTE — PLAN OF CARE
Problem: Overarching Goals (Adult)  Goal: Optimized Coping Skills in Response to Life Stressors    Intervention: Promote Effective Coping Strategies   05/16/19 1806   Coping/Psychosocial Interventions   Supportive Measures active listening utilized;counseling provided;decision-making supported;goal setting facilitated;verbalization of feelings encouraged       Goal: Develops/Participates in Therapeutic [place] to Support Successful Transition    Intervention: Foster Therapeutic [place]   05/16/19 1806   Interventions   Trust Relationship/Rapport reassurance provided;empathic listening provided;emotional support provided     Intervention: Mutually Develop Transition Plan   05/16/19 1806   Mutually Develop Transition Plan   Transition Support follow-up care coordinated;follow-up care discussed;crisis management plan verbalized

## 2019-05-16 NOTE — PLAN OF CARE
Problem: Patient Care Overview  Goal: Plan of Care Review  Outcome: Ongoing (interventions implemented as appropriate)   05/16/19 1523   Coping/Psychosocial   Plan of Care Reviewed With patient   Coping/Psychosocial   Patient Agreement with Plan of Care agrees   Plan of Care Review   Progress improving   OTHER   Outcome Summary Patient has been out of room some, cooperative, anxiety/depression 8, denies S/I, H/I. No bleeding or resp difficulities noted.       Problem: Overarching Goals (Adult)  Goal: Adheres to Safety Considerations for Self and Others  Outcome: Ongoing (interventions implemented as appropriate)    Goal: Optimized Coping Skills in Response to Life Stressors  Outcome: Ongoing (interventions implemented as appropriate)    Goal: Develops/Participates in Therapeutic [place] to Support Successful Transition  Outcome: Ongoing (interventions implemented as appropriate)

## 2019-05-16 NOTE — PROGRESS NOTES
"Data:     Therapist met with patient following initial assessment started yesterday.  Continued to discuss progress and treatment goals.  Educated patient about importance of safety and aftercare plans.  Patient discussed feeling somewhat better though still very anxious.  He denied having panic attack today.  Patient reports feeling depressed and thinking about getting a job.  He reports feeling worried his blood pressure will get under control.  Patient provided with education on healthy coping skills and appeared receptive.  Patient able to identify talk to his spouse and sister as healthy coping skills.  Therapist encouraged patient to not isolate and attend unit activities.  Patient denied concerns and reported plan to follow up with [facility] upon discharge.      Assessment:    Patient is observed to display restricted affect and \"depressed\" mood.  Patient denied SI, HI, and AVH.  He reported fair sleep and fair appetite.  Patient displayed fair insight.  He appeared x3.    Plan:    Therapist will continue to assist daily with aftercare and safety planning as needed.  Patient has aftercare scheduled with [facility] and will return home upon discharge.  "

## 2019-05-16 NOTE — PLAN OF CARE
Problem: Patient Care Overview  Goal: Plan of Care Review  Outcome: Ongoing (interventions implemented as appropriate)   05/15/19 9312   Coping/Psychosocial   Plan of Care Reviewed With patient   Coping/Psychosocial   Patient Agreement with Plan of Care agrees   Plan of Care Review   Progress no change   OTHER   Outcome Summary Patient rates anxiety and depression 8/10. Patient denies si, hi, avh. Patient calm and cooperative.        Problem: Overarching Goals (Adult)  Goal: Adheres to Safety Considerations for Self and Others  Outcome: Ongoing (interventions implemented as appropriate)    Goal: Optimized Coping Skills in Response to Life Stressors  Outcome: Ongoing (interventions implemented as appropriate)    Goal: Develops/Participates in Therapeutic [place] to Support Successful Transition  Outcome: Ongoing (interventions implemented as appropriate)

## 2019-05-16 NOTE — PROGRESS NOTES
"   LOS: 2 days   Patient Care Team:  Virginia Lopez APRN as PCP - General (Nurse Practitioner)    Chief Complaint: Patient says he is doing better but cannot explain how he is better.  He still rating depression at 8, describes it as \"aggravated because cannot find a job\".  Rates his anxiety at 6, describes it as a \"feeling of dread, causes my blood pressure to go up\" he has slept fair about 6 hours.  He denies SI HI hallucinations or paranoia.  He says he is feeling hopeless rates at 6                Vital Signs    Temp:  [97.2 °F (36.2 °C)-97.3 °F (36.3 °C)] 97.2 °F (36.2 °C)  Heart Rate:  [60-65] 65  Resp:  [18-20] 18  BP: (130-151)/() 151/100    Lab Results:   Lab Results (last 24 hours)     ** No results found for the last 24 hours. **           Labs:     Lab Results (last 24 hours)     ** No results found for the last 24 hours. **                        Exam:  Appropriate  Mental Status Exam:     Hygiene:   fair  Cooperation:  Cooperative  Eye Contact:  Good  Psychomotor Behavior:    Affect: Anxious  Speech:  Normal  Thought Progress:  Goal directed  Thought Content:  Mood congurent  Suicidal:  None  Homicidal:  None  Hallucinations:  None  Delusion:  None  Memory:  Intact  Orientation:  Person, Place and Time  Reliability:  fair  Insight:  Fair  Judgement:  Fair  Impulse Control:  Fair    Results Review:    Lab Results (last 24 hours)     ** No results found for the last 24 hours. **          Medication Review:  Hospital Medications (active)       Dose Frequency Start End    acetaminophen (TYLENOL) tablet 650 mg 650 mg Every 4 Hours PRN 5/14/2019     Sig - Route: Take 2 tablets by mouth Every 4 (Four) Hours As Needed for Mild Pain  or Moderate Pain  (Severe Pain (7-10)). - Oral    aluminum-magnesium hydroxide-simethicone (MAALOX MAX) 400-400-40 MG/5ML suspension 15 mL 15 mL Every 6 Hours PRN 5/14/2019     Sig - Route: Take 15 mL by mouth Every 6 (Six) Hours As Needed for Indigestion or Heartburn. - Oral " "   benzonatate (TESSALON) capsule 100 mg 100 mg 3 Times Daily PRN 5/14/2019     Sig - Route: Take 1 capsule by mouth 3 (Three) Times a Day As Needed for Cough. - Oral    benztropine (COGENTIN) injection 0.5 mg 0.5 mg Daily PRN 5/14/2019     Sig - Route: Inject 0.5 mL into the appropriate muscle as directed by prescriber Daily As Needed (Drug-Induced Extrapyramidal Symptoms). - Intramuscular    Linked Group 1:  \"Or\" Linked Group Details        benztropine (COGENTIN) tablet 1 mg 1 mg Daily PRN 5/14/2019     Sig - Route: Take 1 tablet by mouth Daily As Needed (Drug-Induced Extrapyramidal Symptoms). - Oral    Linked Group 1:  \"Or\" Linked Group Details        buPROPion SR (WELLBUTRIN SR) 12 hr tablet 150 mg 150 mg Daily 5/16/2019     Sig - Route: Take 1 tablet by mouth Daily. - Oral    busPIRone (BUSPAR) tablet 5 mg 5 mg Every 8 Hours Scheduled 5/15/2019     Sig - Route: Take 1 tablet by mouth Every 8 (Eight) Hours. - Oral    CloNIDine (CATAPRES) tablet 0.1 mg 0.1 mg Daily 5/15/2019     Sig - Route: Take 1 tablet by mouth Daily. - Oral    escitalopram (LEXAPRO) tablet 5 mg 5 mg Daily 5/15/2019     Sig - Route: Take 0.5 tablets by mouth Daily. - Oral    famotidine (PEPCID) tablet 20 mg 20 mg 2 Times Daily PRN 5/14/2019     Sig - Route: Take 1 tablet by mouth 2 (Two) Times a Day As Needed for Heartburn. - Oral    hydrochlorothiazide (HYDRODIURIL) tablet 12.5 mg 12.5 mg Daily 5/14/2019     Sig - Route: Take 0.5 tablets by mouth Daily. - Oral    hydrOXYzine (ATARAX) tablet 50 mg 50 mg Every 6 Hours PRN 5/14/2019     Sig - Route: Take 1 tablet by mouth Every 6 (Six) Hours As Needed for Anxiety. - Oral    ibuprofen (ADVIL,MOTRIN) tablet 600 mg 600 mg Every 6 Hours PRN 5/14/2019     Sig - Route: Take 1 tablet by mouth Every 6 (Six) Hours As Needed for Mild Pain  or Moderate Pain  (severe pain (7-10)). - Oral    loperamide (IMODIUM) capsule 2 mg 2 mg Every 2 Hours PRN 5/14/2019     Sig - Route: Take 1 capsule by mouth Every 2 " (Two) Hours As Needed for Diarrhea (After Each Loose Stool - Do Not Exceed 16mg in 24 Hours). - Oral    magnesium hydroxide (MILK OF MAGNESIA) suspension 2400 mg/10mL 10 mL 10 mL Daily PRN 5/14/2019     Sig - Route: Take 10 mL by mouth Daily As Needed for Constipation. - Oral    nicotine polacrilex (NICORETTE) gum 2 mg 2 mg Every 2 Hours PRN 5/15/2019     Sig - Route: Chew 1 each Every 2 (Two) Hours As Needed for Smoking Cessation. - Mouth/Throat    ondansetron (ZOFRAN) tablet 4 mg 4 mg Every 6 Hours PRN 5/14/2019     Sig - Route: Take 1 tablet by mouth Every 6 (Six) Hours As Needed for Nausea or Vomiting. - Oral    pantoprazole (PROTONIX) EC tablet 40 mg 40 mg Daily 5/14/2019     Sig - Route: Take 1 tablet by mouth Daily. - Oral    propranolol (INDERAL) tablet 80 mg 80 mg 2 Times Daily 5/14/2019     Sig - Route: Take 2 tablets by mouth 2 (Two) Times a Day. - Oral    sodium chloride nasal spray 2 spray 2 spray As Needed 5/14/2019     Sig - Route: 2 sprays by Each Nare route As Needed for Congestion. - Each Nare    traZODone (DESYREL) tablet 50 mg 50 mg Nightly PRN 5/14/2019     Sig - Route: Take 1 tablet by mouth At Night As Needed for Sleep. - Oral    buPROPion SR (WELLBUTRIN SR) 12 hr tablet 150 mg (Discontinued) 150 mg Every 12 Hours Scheduled 5/14/2019 5/15/2019    Sig - Route: Take 1 tablet by mouth Every 12 (Twelve) Hours. - Oral           Special Precautions Level: III        Assessment/Plan     Assessment:  depression with suicidal ideation  Panic disorder  Hypertension  Cirrhosis of liver and elevated LFTs    Treatment Plan: Discontinue Wellbutrin, increase Lexapro to 10 mg daily,  BuSpar to 10 mg 3 times daily increase  Discussed coping skills, and encouraged outpatient follow-up and counseling.  Also increase clonidine to 0.1 mg twice daily    I have reviewed and approved the behavioral health treatment plans and problem list. Yes      Yolanda Martinez MD  05/16/19  9:50 AM

## 2019-05-17 VITALS
WEIGHT: 194.8 LBS | OXYGEN SATURATION: 98 % | BODY MASS INDEX: 27.27 KG/M2 | HEART RATE: 59 BPM | HEIGHT: 71 IN | SYSTOLIC BLOOD PRESSURE: 150 MMHG | TEMPERATURE: 97.7 F | RESPIRATION RATE: 18 BRPM | DIASTOLIC BLOOD PRESSURE: 96 MMHG

## 2019-05-17 RX ORDER — ESCITALOPRAM OXALATE 20 MG/1
20 TABLET ORAL DAILY
Qty: 15 TABLET | Refills: 0 | Status: SHIPPED | OUTPATIENT
Start: 2019-05-18 | End: 2019-06-02

## 2019-05-17 RX ORDER — ACETAMINOPHEN 325 MG/1
650 TABLET ORAL EVERY 6 HOURS PRN
Status: DISCONTINUED | OUTPATIENT
Start: 2019-05-17 | End: 2019-05-17 | Stop reason: HOSPADM

## 2019-05-17 RX ORDER — HYDROXYZINE 50 MG/1
50 TABLET, FILM COATED ORAL DAILY PRN
Qty: 15 TABLET | Refills: 0 | Status: SHIPPED | OUTPATIENT
Start: 2019-05-17 | End: 2019-06-01

## 2019-05-17 RX ORDER — ESCITALOPRAM OXALATE 10 MG/1
20 TABLET ORAL DAILY
Status: DISCONTINUED | OUTPATIENT
Start: 2019-05-18 | End: 2019-05-17 | Stop reason: HOSPADM

## 2019-05-17 RX ORDER — BUSPIRONE HYDROCHLORIDE 10 MG/1
10 TABLET ORAL EVERY 8 HOURS SCHEDULED
Qty: 45 TABLET | Refills: 0 | Status: SHIPPED | OUTPATIENT
Start: 2019-05-17 | End: 2019-06-01

## 2019-05-17 RX ORDER — CLONIDINE HYDROCHLORIDE 0.1 MG/1
0.1 TABLET ORAL EVERY 12 HOURS SCHEDULED
Qty: 30 TABLET | Refills: 0 | Status: SHIPPED | OUTPATIENT
Start: 2019-05-17 | End: 2019-06-01

## 2019-05-17 RX ORDER — BENZTROPINE MESYLATE 1 MG/ML
1 INJECTION INTRAMUSCULAR; INTRAVENOUS DAILY PRN
Status: DISCONTINUED | OUTPATIENT
Start: 2019-05-17 | End: 2019-05-17 | Stop reason: HOSPADM

## 2019-05-17 RX ORDER — CLONIDINE HYDROCHLORIDE 0.1 MG/1
0.1 TABLET ORAL ONCE
Status: COMPLETED | OUTPATIENT
Start: 2019-05-17 | End: 2019-05-17

## 2019-05-17 RX ORDER — IBUPROFEN 400 MG/1
400 TABLET ORAL EVERY 6 HOURS PRN
Status: DISCONTINUED | OUTPATIENT
Start: 2019-05-17 | End: 2019-05-17 | Stop reason: HOSPADM

## 2019-05-17 RX ORDER — BENZTROPINE MESYLATE 1 MG/1
2 TABLET ORAL DAILY PRN
Status: DISCONTINUED | OUTPATIENT
Start: 2019-05-17 | End: 2019-05-17 | Stop reason: HOSPADM

## 2019-05-17 RX ADMIN — ESCITALOPRAM OXALATE 10 MG: 10 TABLET, FILM COATED ORAL at 07:25

## 2019-05-17 RX ADMIN — NICOTINE POLACRILEX 2 MG: 2 GUM, CHEWING BUCCAL at 10:46

## 2019-05-17 RX ADMIN — BUSPIRONE HYDROCHLORIDE 10 MG: 10 TABLET ORAL at 14:17

## 2019-05-17 RX ADMIN — PROPRANOLOL HYDROCHLORIDE 80 MG: 40 TABLET ORAL at 07:25

## 2019-05-17 RX ADMIN — CLONIDINE HYDROCHLORIDE 0.1 MG: 0.1 TABLET ORAL at 07:26

## 2019-05-17 RX ADMIN — NICOTINE POLACRILEX 2 MG: 2 GUM, CHEWING BUCCAL at 14:18

## 2019-05-17 RX ADMIN — BUSPIRONE HYDROCHLORIDE 10 MG: 10 TABLET ORAL at 06:17

## 2019-05-17 RX ADMIN — PANTOPRAZOLE SODIUM 40 MG: 40 TABLET, DELAYED RELEASE ORAL at 07:25

## 2019-05-17 RX ADMIN — CLONIDINE HYDROCHLORIDE 0.1 MG: 0.1 TABLET ORAL at 11:12

## 2019-05-17 RX ADMIN — HYDROCHLOROTHIAZIDE 12.5 MG: 25 TABLET ORAL at 07:26

## 2019-05-17 NOTE — DISCHARGE SUMMARY
Date of Discharge:  5/17/2019    Presenting Problem/History of Present Illness  MDD with SI   Interval history patient was seen today he reports doing better, denies depression, rates anxiety at 5 or 6, thinks this is because his blood pressure has been high.  He denies SI HI hallucinations or paranoia.  He is well oriented  Hospital Course  Patient hospitalized on 5/14/2019 after he presented to the emergency room reporting increased anxiety and depression, verbalize SI to the ED provider during medical clearance.  Patient was placed on special precautions level 3 and his home medications consisting of Wellbutrin.    Clonidine, 0.1 mg daily, hydrochlorothiazide 12.5 mg daily and propranolol 80 mg twice daily.  I saw him on 5/15/2019 when I did the initial history and physical examination, noted that he was reporting anxiety due to his medical problems, admitted's suicidal thoughts prior to admission but denied since he had been in the hospital.  He was also requesting change of antidepressant medication, as Lexapro had helped him better in the past.  We tapered Wellbutrin and he was started on Lexapro.,  Also given BuSpar and Vistaril as needed for anxiety.  Patient was seen , his psychiatric condition remained stable he continued to deny SI HI hallucinations or paranoia.   when seen on 5/17/2019 he reported doing well as described above.  He was discharged home on 5/17/2019 he was to follow-up at, [facility] in [facility].  He was advised to up with his family physician and referral for GI evaluation was recommended.  Procedures Performed         Consults:   Consults     No orders found from 4/15/2019 to 5/15/2019.        CMP showed low creatinine at 0.70, magnesium level was normal at 1.6   he has committed CBC showed low platelet count at 90 and elevated eosinophil count at 8.9.  Urine drug screen was negative  Serum alcohol level prior to admission was less than 10  EKG showed normal sinus  rhythm        Discharge Disposition      Discharge Medications     Your medication list      ASK your doctor about these medications      Instructions Last Dose Given Next Dose Due   albuterol sulfate  (90 Base) MCG/ACT inhaler  Commonly known as:  PROVENTIL HFA;VENTOLIN HFA;PROAIR HFA      Inhale 2 puffs Every 4 (Four) Hours As Needed for Wheezing.       buPROPion  MG 12 hr tablet  Commonly known as:  WELLBUTRIN SR      Take 150 mg by mouth 2 (Two) Times a Day.       CloNIDine 0.1 MG tablet  Commonly known as:  CATAPRES      Take 0.1 mg by mouth Daily.       hydrochlorothiazide 12.5 MG tablet  Commonly known as:  HYDRODIURIL      Take 12.5 mg by mouth Daily.       ibuprofen 600 MG tablet  Commonly known as:  ADVIL,MOTRIN      Take 600 mg by mouth Every 6 (Six) Hours As Needed for Mild Pain .       pantoprazole 20 MG EC tablet  Commonly known as:  PROTONIX      Take 20 mg by mouth Daily.       propranolol 80 MG tablet  Commonly known as:  INDERAL      Take 80 mg by mouth 2 (Two) Times a Day.              Lab Results (last 24 hours)     ** No results found for the last 24 hours. **        Follow-up Appointments  No future appointments.      Discharge Diagnosis: Depression with suicidal ideation  Panic disorder  Hypertension  Cirrhosis of liver and elevated LFTs              Yolanda Martinez MD  05/17/19  11:12 AM

## 2019-05-17 NOTE — PLAN OF CARE
Problem: Patient Care Overview  Goal: Plan of Care Review  Outcome: Ongoing (interventions implemented as appropriate)  Rated anxiety and depression as 6. Denies S.I.  Out of room during evening shift, but was quiet and without complaint. Anticipates being discharged today. Appeared to sleep well this shift.   05/17/19 0534   Coping/Psychosocial   Plan of Care Reviewed With patient   Coping/Psychosocial   Patient Agreement with Plan of Care agrees   Plan of Care Review   Progress improving       Problem: Overarching Goals (Adult)  Goal: Adheres to Safety Considerations for Self and Others  Outcome: Ongoing (interventions implemented as appropriate)    Goal: Optimized Coping Skills in Response to Life Stressors  Outcome: Ongoing (interventions implemented as appropriate)    Goal: Develops/Participates in Therapeutic [place] to Support Successful Transition  Outcome: Ongoing (interventions implemented as appropriate)

## 2019-05-17 NOTE — PROGRESS NOTES
1010  Patient consented for sister Griselda Agosto.  Therapist spoke with Griselda via phone to discuss disposition and aftercare.  Griselda discussed that she will transport patient home and also to his aftercare appointments.  She request his appointment be made on a Thursday if possible.  She denied concerns and agreeable for patient to return home, reported home to be safeguarded.  Provided education for patient to seek nearest ER if his symptoms become acute in the future.

## 2019-05-17 NOTE — PROGRESS NOTES
Behavioral Health Discharge Summary             Please fax within 24 hours of discharge to [facility] at: [phone]      Member Name: Brandon Dillon Member ID: [id number]   Authorization Number: [id number] Phone: [phone]   Member Address: [facility] [place] [id number]   Discharge Date: 05/17/19 Level of Care at Discharge:    Facility: [facility] Staff Completing Form: Yu BEACH RN    If the member is being discharged directly to a residential or extended care program, please specify the type below.   __Private Child-Caring Facility (PCC) Residential/Group Home   __Private Child-Caring Facility (PCC) Therapeutic Foster Care   __Residential Treatment Facility (RTF)   __Psychiatric Residential Treatment Facility (PRTF I or II)   __Long-Term Acute Inpatient Hospital Services or Extended Care Unit (ECU)   __Other (please specify):    Brief discharge summary of treatment received (for follow up by the case management team): D/C clinical with list of medications and follow up appts given to patient upon discharge.     BRIEF SUMMARY OF RECOMMENDATIONS FOR ONGOING TREATMENT     Discharged to where: Home   Discharge diagnoses: F 32.9   Axis I:    Axis II:    Axis III:    Axis IV:    Axis V:    Does the member understand his/her DX?  Yes          Medication     Dose     Schedule Supply/  Quantity  Given at Discharge RX Provided  Yes/No  If Rx Provided, Quantity RX Prior Auth Required  Yes/No Prior Auth  Completed   Wellbutrin  150 mg  BID         Clonidine  0.1 mg  Daily        Inderal  80 mg  BID                                                                     Does the member understand the reason for taking these medications? Yes                                                           FOLLOW-UP APPOINTMENTS   Please schedule within 7 days of discharge and provide appointment details for all referred services.    PCP/Other Providers Involved in Treatment:     Appointment Type:  OP      Provider Name: JERZY Carlin Provider Phone:   [phone] Appointment Date: 05/24/19 Appointment Time:   8:45 with Mary Crabtree   (new to OP services)        Case Management    Is the member already enrolled in case management?  Yes/No  If yes, date the CM was notified:    If no, was the CM referral offered?  Yes/No  Accepted? Yes/No    Is the Release of Information in the chart? Yes/No:      Medication Management (for member discharged with psychiatric medications):      A&D Treatment (for member with substance abuse/   dependence in the past year):      Medical Condition (for member with a medical condition):    Other recommended treatment:    Do you have any concerns about the discharge plan?  No    If yes, explain:    Was the member involved in the discharge planning?  Yes    If no, explain:    Was a copy of the discharge plan provided to the member?  Yes    If no, explain:

## 2019-09-26 ENCOUNTER — TRANSCRIBE ORDERS (OUTPATIENT)
Dept: ADMINISTRATIVE | Facility: HOSPITAL | Age: 54
End: 2019-09-26

## 2019-09-26 DIAGNOSIS — B18.2 HEPATITIS C CARRIER (HCC): Primary | ICD-10-CM

## 2019-10-16 ENCOUNTER — APPOINTMENT (OUTPATIENT)
Dept: GENERAL RADIOLOGY | Facility: HOSPITAL | Age: 54
End: 2019-10-16

## 2019-10-16 ENCOUNTER — HOSPITAL ENCOUNTER (EMERGENCY)
Facility: HOSPITAL | Age: 54
Discharge: HOME | End: 2019-10-16
Attending: EMERGENCY MEDICINE | Admitting: EMERGENCY MEDICINE

## 2019-10-16 VITALS
BODY MASS INDEX: 28.63 KG/M2 | RESPIRATION RATE: 18 BRPM | SYSTOLIC BLOOD PRESSURE: 110 MMHG | WEIGHT: 200 LBS | HEART RATE: 62 BPM | HEIGHT: 70 IN | TEMPERATURE: 98 F | OXYGEN SATURATION: 95 % | DIASTOLIC BLOOD PRESSURE: 78 MMHG

## 2019-10-16 DIAGNOSIS — J45.901 ASTHMATIC BRONCHITIS WITH ACUTE EXACERBATION, UNSPECIFIED ASTHMA SEVERITY, UNSPECIFIED WHETHER PERSISTENT: Primary | ICD-10-CM

## 2019-10-16 LAB
A-A DO2: 33.6 MMHG (ref 0–300)
ALBUMIN SERPL-MCNC: 4.21 G/DL (ref 3.5–5.2)
ALBUMIN/GLOB SERPL: 1 G/DL
ALP SERPL-CCNC: 83 U/L (ref 39–117)
ALT SERPL W P-5'-P-CCNC: 68 U/L (ref 1–41)
ANION GAP SERPL CALCULATED.3IONS-SCNC: 13.1 MMOL/L (ref 5–15)
ARTERIAL PATENCY WRIST A: POSITIVE
AST SERPL-CCNC: 72 U/L (ref 1–40)
ATMOSPHERIC PRESS: 727 MMHG
BASE EXCESS BLDA CALC-SCNC: 3.7 MMOL/L (ref 0–2)
BASOPHILS # BLD AUTO: 0.04 10*3/MM3 (ref 0–0.2)
BASOPHILS NFR BLD AUTO: 0.3 % (ref 0–1.5)
BDY SITE: ABNORMAL
BILIRUB SERPL-MCNC: 1 MG/DL (ref 0.2–1.2)
BODY TEMPERATURE: 0 C
BUN BLD-MCNC: 13 MG/DL (ref 6–20)
BUN/CREAT SERPL: 20.3 (ref 7–25)
CALCIUM SPEC-SCNC: 9.6 MG/DL (ref 8.6–10.5)
CHLORIDE SERPL-SCNC: 97 MMOL/L (ref 98–107)
CO2 BLDA-SCNC: 29.3 MMOL/L (ref 22–33)
CO2 SERPL-SCNC: 29.9 MMOL/L (ref 22–29)
COHGB MFR BLD: 1.2 % (ref 0–5)
CREAT BLD-MCNC: 0.64 MG/DL (ref 0.76–1.27)
DEPRECATED RDW RBC AUTO: 44.5 FL (ref 37–54)
EOSINOPHIL # BLD AUTO: 0.3 10*3/MM3 (ref 0–0.4)
EOSINOPHIL NFR BLD AUTO: 2.5 % (ref 0.3–6.2)
ERYTHROCYTE [DISTWIDTH] IN BLOOD BY AUTOMATED COUNT: 13.8 % (ref 12.3–15.4)
GFR SERPL CREATININE-BSD FRML MDRD: 132 ML/MIN/1.73
GLOBULIN UR ELPH-MCNC: 4.3 GM/DL
GLUCOSE BLD-MCNC: 99 MG/DL (ref 65–99)
HCO3 BLDA-SCNC: 28 MMOL/L (ref 20–26)
HCT VFR BLD AUTO: 43.9 % (ref 37.5–51)
HCT VFR BLD CALC: 44 % (ref 38–51)
HGB BLD-MCNC: 14.9 G/DL (ref 13–17.7)
HGB BLDA-MCNC: 14.4 G/DL (ref 14–18)
HOROWITZ INDEX BLD+IHG-RTO: 21 %
IMM GRANULOCYTES # BLD AUTO: 0.02 10*3/MM3 (ref 0–0.05)
IMM GRANULOCYTES NFR BLD AUTO: 0.2 % (ref 0–0.5)
LYMPHOCYTES # BLD AUTO: 2.66 10*3/MM3 (ref 0.7–3.1)
LYMPHOCYTES NFR BLD AUTO: 22.6 % (ref 19.6–45.3)
Lab: ABNORMAL
MCH RBC QN AUTO: 30.6 PG (ref 26.6–33)
MCHC RBC AUTO-ENTMCNC: 33.9 G/DL (ref 31.5–35.7)
MCV RBC AUTO: 90.1 FL (ref 79–97)
METHGB BLD QL: 0.4 % (ref 0–3)
MODALITY: ABNORMAL
MONOCYTES # BLD AUTO: 1.25 10*3/MM3 (ref 0.1–0.9)
MONOCYTES NFR BLD AUTO: 10.6 % (ref 5–12)
NEUTROPHILS # BLD AUTO: 7.5 10*3/MM3 (ref 1.7–7)
NEUTROPHILS NFR BLD AUTO: 63.8 % (ref 42.7–76)
NOTE: ABNORMAL
NT-PROBNP SERPL-MCNC: 163.8 PG/ML (ref 5–900)
OXYHGB MFR BLDV: 92.5 % (ref 94–99)
PCO2 BLDA: 40.4 MM HG (ref 35–45)
PCO2 TEMP ADJ BLD: ABNORMAL MM[HG]
PH BLDA: 7.45 PH UNITS (ref 7.35–7.45)
PH, TEMP CORRECTED: ABNORMAL
PLATELET # BLD AUTO: 220 10*3/MM3 (ref 140–450)
PMV BLD AUTO: 10.5 FL (ref 6–12)
PO2 BLDA: 63.5 MM HG (ref 83–108)
PO2 TEMP ADJ BLD: ABNORMAL MM[HG]
POTASSIUM BLD-SCNC: 4.2 MMOL/L (ref 3.5–5.2)
PROT SERPL-MCNC: 8.5 G/DL (ref 6–8.5)
RBC # BLD AUTO: 4.87 10*6/MM3 (ref 4.14–5.8)
SAO2 % BLDCOA: 94 % (ref 94–99)
SODIUM BLD-SCNC: 140 MMOL/L (ref 136–145)
TROPONIN T SERPL-MCNC: <0.01 NG/ML (ref 0–0.03)
VENTILATOR MODE: ABNORMAL
WBC NRBC COR # BLD: 11.77 10*3/MM3 (ref 3.4–10.8)

## 2019-10-16 PROCEDURE — 83050 HGB METHEMOGLOBIN QUAN: CPT

## 2019-10-16 PROCEDURE — 71101 X-RAY EXAM UNILAT RIBS/CHEST: CPT | Performed by: RADIOLOGY

## 2019-10-16 PROCEDURE — 94799 UNLISTED PULMONARY SVC/PX: CPT

## 2019-10-16 PROCEDURE — 25010000002 KETOROLAC TROMETHAMINE PER 15 MG: Performed by: EMERGENCY MEDICINE

## 2019-10-16 PROCEDURE — 82805 BLOOD GASES W/O2 SATURATION: CPT

## 2019-10-16 PROCEDURE — 83880 ASSAY OF NATRIURETIC PEPTIDE: CPT | Performed by: EMERGENCY MEDICINE

## 2019-10-16 PROCEDURE — 93010 ELECTROCARDIOGRAM REPORT: CPT | Performed by: INTERNAL MEDICINE

## 2019-10-16 PROCEDURE — 96367 TX/PROPH/DG ADDL SEQ IV INF: CPT

## 2019-10-16 PROCEDURE — 87040 BLOOD CULTURE FOR BACTERIA: CPT | Performed by: EMERGENCY MEDICINE

## 2019-10-16 PROCEDURE — 85025 COMPLETE CBC W/AUTO DIFF WBC: CPT | Performed by: EMERGENCY MEDICINE

## 2019-10-16 PROCEDURE — 80053 COMPREHEN METABOLIC PANEL: CPT | Performed by: EMERGENCY MEDICINE

## 2019-10-16 PROCEDURE — 84484 ASSAY OF TROPONIN QUANT: CPT | Performed by: EMERGENCY MEDICINE

## 2019-10-16 PROCEDURE — 25010000002 AZITHROMYCIN: Performed by: EMERGENCY MEDICINE

## 2019-10-16 PROCEDURE — 93005 ELECTROCARDIOGRAM TRACING: CPT | Performed by: EMERGENCY MEDICINE

## 2019-10-16 PROCEDURE — 71045 X-RAY EXAM CHEST 1 VIEW: CPT | Performed by: RADIOLOGY

## 2019-10-16 PROCEDURE — 25010000002 CEFTRIAXONE: Performed by: EMERGENCY MEDICINE

## 2019-10-16 PROCEDURE — 71101 X-RAY EXAM UNILAT RIBS/CHEST: CPT

## 2019-10-16 PROCEDURE — 82375 ASSAY CARBOXYHB QUANT: CPT

## 2019-10-16 PROCEDURE — 36600 WITHDRAWAL OF ARTERIAL BLOOD: CPT

## 2019-10-16 PROCEDURE — 99284 EMERGENCY DEPT VISIT MOD MDM: CPT

## 2019-10-16 PROCEDURE — 71045 X-RAY EXAM CHEST 1 VIEW: CPT

## 2019-10-16 PROCEDURE — 96365 THER/PROPH/DIAG IV INF INIT: CPT

## 2019-10-16 PROCEDURE — 94640 AIRWAY INHALATION TREATMENT: CPT

## 2019-10-16 PROCEDURE — 96375 TX/PRO/DX INJ NEW DRUG ADDON: CPT

## 2019-10-16 PROCEDURE — 25010000002 METHYLPREDNISOLONE PER 125 MG: Performed by: EMERGENCY MEDICINE

## 2019-10-16 RX ORDER — METHYLPREDNISOLONE 4 MG/1
TABLET ORAL
Qty: 1 EACH | Refills: 0 | Status: SHIPPED | OUTPATIENT
Start: 2019-10-16 | End: 2019-11-13

## 2019-10-16 RX ORDER — IPRATROPIUM BROMIDE AND ALBUTEROL SULFATE 2.5; .5 MG/3ML; MG/3ML
3 SOLUTION RESPIRATORY (INHALATION) ONCE
Status: COMPLETED | OUTPATIENT
Start: 2019-10-16 | End: 2019-10-16

## 2019-10-16 RX ORDER — KETOROLAC TROMETHAMINE 30 MG/ML
30 INJECTION, SOLUTION INTRAMUSCULAR; INTRAVENOUS ONCE
Status: COMPLETED | OUTPATIENT
Start: 2019-10-16 | End: 2019-10-16

## 2019-10-16 RX ORDER — DOXYCYCLINE 100 MG/1
100 CAPSULE ORAL 2 TIMES DAILY
Qty: 20 CAPSULE | Refills: 0 | Status: SHIPPED | OUTPATIENT
Start: 2019-10-16 | End: 2019-11-13

## 2019-10-16 RX ORDER — METHYLPREDNISOLONE SODIUM SUCCINATE 125 MG/2ML
125 INJECTION, POWDER, LYOPHILIZED, FOR SOLUTION INTRAMUSCULAR; INTRAVENOUS ONCE
Status: COMPLETED | OUTPATIENT
Start: 2019-10-16 | End: 2019-10-16

## 2019-10-16 RX ORDER — SODIUM CHLORIDE 0.9 % (FLUSH) 0.9 %
10 SYRINGE (ML) INJECTION AS NEEDED
Status: DISCONTINUED | OUTPATIENT
Start: 2019-10-16 | End: 2019-10-16 | Stop reason: HOSPADM

## 2019-10-16 RX ADMIN — METHYLPREDNISOLONE SODIUM SUCCINATE 125 MG: 125 INJECTION, POWDER, FOR SOLUTION INTRAMUSCULAR; INTRAVENOUS at 18:26

## 2019-10-16 RX ADMIN — CEFTRIAXONE 1 G: 1 INJECTION, POWDER, FOR SOLUTION INTRAMUSCULAR; INTRAVENOUS at 18:40

## 2019-10-16 RX ADMIN — IPRATROPIUM BROMIDE AND ALBUTEROL SULFATE 3 ML: .5; 3 SOLUTION RESPIRATORY (INHALATION) at 18:33

## 2019-10-16 RX ADMIN — KETOROLAC TROMETHAMINE 30 MG: 30 INJECTION, SOLUTION INTRAMUSCULAR; INTRAVENOUS at 19:28

## 2019-10-16 RX ADMIN — AZITHROMYCIN MONOHYDRATE 500 MG: 500 INJECTION, POWDER, LYOPHILIZED, FOR SOLUTION INTRAVENOUS at 19:12

## 2019-10-16 NOTE — ED PROVIDER NOTES
"Subjective   53-year-old white male here for shortness of breath.  He complains of a 2-week history of shortness of breath, cough.  Initially he was seen at immediate care and was given a steroid shot and antibiotics.  He initially improved but in the last several days has gotten worse again.  He primarily complains of increased pain in his right chest, worse with deep breathing or movement.  He says that about 3 to 4 days ago he was reaching for the remote and fell over the back of his couch onto his right side.  He has cough which is nonproductive.  He denies any fever, chills, abdominal pain, nausea, vomiting or other complaints.            Review of Systems   All other systems reviewed and are negative.      Past Medical History:   Diagnosis Date   • Acid reflux    • Anxiety    • Asthma     as child   • Cirrhosis (CMS/[facility])    • Depression    • History of substance abuse (CMS/[facility])     Clean since May 31st 2012.    • Hypertension    • Migraines    • Suicide attempt (CMS/[facility])     \"Years ago, I took a bunch of pills.\"       Allergies   Allergen Reactions   • Sulfa Antibiotics Hives, Shortness Of Breath and Swelling   • Topamax [Topiramate] Dermatitis       Past Surgical History:   Procedure Laterality Date   • FACIAL FRACTURE SURGERY         Family History   Problem Relation Age of Onset   • Dementia Mother    • Anxiety disorder Mother    • Heart disease Father    • Anxiety disorder Maternal Grandfather    • Depression Maternal Grandfather        Social History     Socioeconomic History   • Marital status:      Spouse name: Not on file   • Number of children: Not on file   • Years of education: Not on file   • Highest education level: Not on file   Tobacco Use   • Smoking status: Former Smoker     Packs/day: 0.50     Years: 2.00     Pack years: 1.00     Last attempt to quit:      Years since quittin.7   • Smokeless tobacco: Current User     Types: Snuff   • Tobacco comment: 1 can per day "   Substance and Sexual Activity   • Alcohol use: No     Frequency: Never   • Drug use: No   • Sexual activity: Yes     Comment: unemployed,  has 2 sons/2 daughters           Objective   Physical Exam   Constitutional: He is oriented to person, place, and time. He appears well-developed and well-nourished.   HENT:   Mouth/Throat: Oropharynx is clear and moist.   Eyes: Conjunctivae are normal.   Cardiovascular: Normal rate, regular rhythm and normal heart sounds. Exam reveals no gallop and no friction rub.   No murmur heard.  Pulmonary/Chest: Effort normal. No respiratory distress. He has wheezes (Throughout). He has no rhonchi. He has no rales. He exhibits tenderness. He exhibits no crepitus and no deformity.       Abdominal: Soft. Bowel sounds are normal. He exhibits no distension. There is no tenderness.   Musculoskeletal: Normal range of motion. He exhibits no edema.   Neurological: He is alert and oriented to person, place, and time.   Skin: Skin is warm and dry.   Psychiatric: He has a normal mood and affect.   Nursing note and vitals reviewed.      Procedures  Results for orders placed or performed during the hospital encounter of 10/16/19   Comprehensive Metabolic Panel   Result Value Ref Range    Glucose 99 65 - 99 mg/dL    BUN 13 6 - 20 mg/dL    Creatinine 0.64 (L) 0.76 - 1.27 mg/dL    Sodium 140 136 - 145 mmol/L    Potassium 4.2 3.5 - 5.2 mmol/L    Chloride 97 (L) 98 - 107 mmol/L    CO2 29.9 (H) 22.0 - 29.0 mmol/L    Calcium 9.6 8.6 - 10.5 mg/dL    Total Protein 8.5 6.0 - 8.5 g/dL    Albumin 4.21 3.50 - 5.20 g/dL    ALT (SGPT) 68 (H) 1 - 41 U/L    AST (SGOT) 72 (H) 1 - 40 U/L    Alkaline Phosphatase 83 39 - 117 U/L    Total Bilirubin 1.0 0.2 - 1.2 mg/dL    eGFR Non African Amer 132 >60 mL/min/1.73    Globulin 4.3 gm/dL    A/G Ratio 1.0 g/dL    BUN/Creatinine Ratio 20.3 7.0 - 25.0    Anion Gap 13.1 5.0 - 15.0 mmol/L   BNP   Result Value Ref Range    proBNP 163.8 5.0 - 900.0 pg/mL   Troponin   Result  Value Ref Range    Troponin T <0.010 0.000 - 0.030 ng/mL   CBC Auto Differential   Result Value Ref Range    WBC 11.77 (H) 3.40 - 10.80 10*3/mm3    RBC 4.87 4.14 - 5.80 10*6/mm3    Hemoglobin 14.9 13.0 - 17.7 g/dL    Hematocrit 43.9 37.5 - 51.0 %    MCV 90.1 79.0 - 97.0 fL    MCH 30.6 26.6 - 33.0 pg    MCHC 33.9 31.5 - 35.7 g/dL    RDW 13.8 12.3 - 15.4 %    RDW-SD 44.5 37.0 - 54.0 fl    MPV 10.5 6.0 - 12.0 fL    Platelets 220 140 - 450 10*3/mm3    Neutrophil % 63.8 42.7 - 76.0 %    Lymphocyte % 22.6 19.6 - 45.3 %    Monocyte % 10.6 5.0 - 12.0 %    Eosinophil % 2.5 0.3 - 6.2 %    Basophil % 0.3 0.0 - 1.5 %    Immature Grans % 0.2 0.0 - 0.5 %    Neutrophils, Absolute 7.50 (H) 1.70 - 7.00 10*3/mm3    Lymphocytes, Absolute 2.66 0.70 - 3.10 10*3/mm3    Monocytes, Absolute 1.25 (H) 0.10 - 0.90 10*3/mm3    Eosinophils, Absolute 0.30 0.00 - 0.40 10*3/mm3    Basophils, Absolute 0.04 0.00 - 0.20 10*3/mm3    Immature Grans, Absolute 0.02 0.00 - 0.05 10*3/mm3   Blood Gas, Arterial With Co-Ox   Result Value Ref Range    Site Left Radial     Caden's Test Positive     pH, Arterial 7.449 7.350 - 7.450 pH units    pCO2, Arterial 40.4 35.0 - 45.0 mm Hg    pO2, Arterial 63.5 (L) 83.0 - 108.0 mm Hg    HCO3, Arterial 28.0 (H) 20.0 - 26.0 mmol/L    Base Excess, Arterial 3.7 (H) 0.0 - 2.0 mmol/L    O2 Saturation, Arterial 94.0 94.0 - 99.0 %    Hemoglobin, Blood Gas 14.4 14 - 18 g/dL    Hematocrit, Blood Gas 44.0 38.0 - 51.0 %    Oxyhemoglobin 92.5 (L) 94 - 99 %    Methemoglobin 0.40 0.00 - 3.00 %    Carboxyhemoglobin 1.2 0 - 5 %    A-a Gradiant 33.6 0.0 - 300.0 mmHg    CO2 Content 29.3 22 - 33 mmol/L    Temperature 0.0 C    Barometric Pressure for Blood Gas 727 mmHg    Modality Room Air     FIO2 21 %    Ventilator Mode NA     Note      Collected by [id number]     pH, Temp Corrected      pCO2, Temperature Corrected      pO2, Temperature Corrected       Xr Ribs Right With Pa Chest    Result Date: 10/16/2019  Narrative: EXAMINATION: XR RIBS  RIGHT W PA CHEST-  CLINICAL INDICATION: Chest wall pain; J45.901-Unspecified asthma with (acute) exacerbation   COMPARISON: 10/16/2019  FINDINGS: One view of the chest and 4 dedicated images of the right ribs show the lungs to be adequately aerated  The rib detail images show no acute rib fracture  No pneumothorax  No blastic or lytic lesion      Impression: No acute rib fracture No pneumothorax Lungs are adequately aerated  This report was finalized on 10/16/2019 8:10 PM by Dr. Francisco Drummond MD.      Xr Chest 1 View    Result Date: 10/16/2019  Narrative: XR CHEST 1 VW-  CLINICAL INDICATION: sob   COMPARISON: None available  TECHNIQUE: Single frontal view of the chest.  FINDINGS:  There is no focal alveolar infiltrate or effusion. The cardiac silhouette is normal. The pulmonary vasculature is unremarkable. There is no evidence of an acute osseous abnormality. There are no suspicious-appearing parenchymal soft tissue nodules.       Impression: No evidence of active or acute cardiopulmonary disease on today's chest radiograph.  This report was finalized on 10/16/2019 6:29 PM by Dr. Francisco Drummond MD.               ED Course  ED Course as of Oct 16 2040   Wed Oct 16, 2019   1926 Endorsed to Dr. Jones at shift change.  [BC]   1945 Impression    No evidence of active or acute cardiopulmonary disease on today's chest  radiograph.     XR Chest 1 View [ES]   2038 Impression    No acute rib fracture  No pneumothorax  Lungs are adequately aerated      XR Ribs Right With PA Chest [ES]   2038 Normal Sinus Rhythm  No Acute ST Elevation or Acute Changes  Vent Rate: 70 bpm  WY Interval: 150 ms  QRS Duration: 98 ms  QT/QTc: 412/444 ms ECG 12 Lead [ES]      ED Course User Index  [BC] James Lozada MD  [ES] Dave Jones MD                  MDM  Number of Diagnoses or Management Options     Amount and/or Complexity of Data Reviewed  Clinical lab tests: reviewed  Tests in the radiology section of CPT®:  reviewed  Tests in the medicine section of CPT®: reviewed    Risk of Complications, Morbidity, and/or Mortality  Presenting problems: high  Diagnostic procedures: high  Management options: moderate        Final diagnoses:   Asthmatic bronchitis with acute exacerbation, unspecified asthma severity, unspecified whether persistent              Dave Jones MD  10/16/19 2040

## 2019-10-21 LAB
BACTERIA SPEC AEROBE CULT: NORMAL
BACTERIA SPEC AEROBE CULT: NORMAL

## 2019-11-13 ENCOUNTER — OFFICE VISIT (OUTPATIENT)
Dept: PHARMACY | Facility: HOSPITAL | Age: 54
End: 2019-11-13

## 2019-11-13 VITALS
OXYGEN SATURATION: 97 % | SYSTOLIC BLOOD PRESSURE: 132 MMHG | WEIGHT: 200 LBS | BODY MASS INDEX: 28.63 KG/M2 | HEIGHT: 70 IN | HEART RATE: 75 BPM | DIASTOLIC BLOOD PRESSURE: 86 MMHG

## 2019-11-13 DIAGNOSIS — B18.2 CHRONIC HEPATITIS C WITHOUT HEPATIC COMA (HCC): Primary | ICD-10-CM

## 2019-11-13 DIAGNOSIS — L81.8 HISTORY OF TATTOO: ICD-10-CM

## 2019-11-13 LAB
6-ACETYL MORPHINE: NEGATIVE
ALBUMIN SERPL-MCNC: 4 G/DL (ref 3.5–5.2)
ALBUMIN/GLOB SERPL: 1.2 G/DL
ALP SERPL-CCNC: 121 U/L (ref 39–117)
ALPHA-FETOPROTEIN: 10.31 NG/ML (ref 0–8.3)
ALT SERPL W P-5'-P-CCNC: 129 U/L (ref 1–41)
AMPHET+METHAMPHET UR QL: NEGATIVE
ANION GAP SERPL CALCULATED.3IONS-SCNC: 13.9 MMOL/L (ref 5–15)
AST SERPL-CCNC: 131 U/L (ref 1–40)
BARBITURATES UR QL SCN: NEGATIVE
BENZODIAZ UR QL SCN: NEGATIVE
BILIRUB SERPL-MCNC: 0.9 MG/DL (ref 0.2–1.2)
BUN BLD-MCNC: 6 MG/DL (ref 6–20)
BUN/CREAT SERPL: 8.1 (ref 7–25)
BUPRENORPHINE SERPL-MCNC: NEGATIVE NG/ML
CALCIUM SPEC-SCNC: 9.2 MG/DL (ref 8.6–10.5)
CANNABINOIDS SERPL QL: NEGATIVE
CHLORIDE SERPL-SCNC: 104 MMOL/L (ref 98–107)
CO2 SERPL-SCNC: 27.1 MMOL/L (ref 22–29)
COCAINE UR QL: NEGATIVE
CREAT BLD-MCNC: 0.74 MG/DL (ref 0.76–1.27)
DEPRECATED RDW RBC AUTO: 46.2 FL (ref 37–54)
ERYTHROCYTE [DISTWIDTH] IN BLOOD BY AUTOMATED COUNT: 13.6 % (ref 12.3–15.4)
GFR SERPL CREATININE-BSD FRML MDRD: 111 ML/MIN/1.73
GLOBULIN UR ELPH-MCNC: 3.3 GM/DL
GLUCOSE BLD-MCNC: 124 MG/DL (ref 65–99)
HCT VFR BLD AUTO: 38.9 % (ref 37.5–51)
HGB BLD-MCNC: 13.5 G/DL (ref 13–17.7)
IRON 24H UR-MRATE: 95 MCG/DL (ref 59–158)
IRON SATN MFR SERPL: 21 % (ref 20–50)
MCH RBC QN AUTO: 32.3 PG (ref 26.6–33)
MCHC RBC AUTO-ENTMCNC: 34.7 G/DL (ref 31.5–35.7)
MCV RBC AUTO: 93.1 FL (ref 79–97)
METHADONE UR QL SCN: NEGATIVE
OPIATES UR QL: NEGATIVE
OXYCODONE UR QL SCN: NEGATIVE
PCP UR QL SCN: NEGATIVE
PLATELET # BLD AUTO: 163 10*3/MM3 (ref 140–450)
PMV BLD AUTO: 12.1 FL (ref 6–12)
POTASSIUM BLD-SCNC: 3.8 MMOL/L (ref 3.5–5.2)
PROT SERPL-MCNC: 7.3 G/DL (ref 6–8.5)
RBC # BLD AUTO: 4.18 10*6/MM3 (ref 4.14–5.8)
SODIUM BLD-SCNC: 145 MMOL/L (ref 136–145)
TIBC SERPL-MCNC: 463 MCG/DL (ref 298–536)
TRANSFERRIN SERPL-MCNC: 311 MG/DL (ref 200–360)
WBC NRBC COR # BLD: 5.29 10*3/MM3 (ref 3.4–10.8)

## 2019-11-13 PROCEDURE — 99243 OFF/OP CNSLTJ NEW/EST LOW 30: CPT | Performed by: PHYSICIAN ASSISTANT

## 2019-11-13 PROCEDURE — 85027 COMPLETE CBC AUTOMATED: CPT

## 2019-11-13 PROCEDURE — 80307 DRUG TEST PRSMV CHEM ANLYZR: CPT

## 2019-11-13 PROCEDURE — 83540 ASSAY OF IRON: CPT

## 2019-11-13 PROCEDURE — 80053 COMPREHEN METABOLIC PANEL: CPT

## 2019-11-13 PROCEDURE — 36415 COLL VENOUS BLD VENIPUNCTURE: CPT

## 2019-11-13 PROCEDURE — 82105 ALPHA-FETOPROTEIN SERUM: CPT

## 2019-11-13 PROCEDURE — 87522 HEPATITIS C REVRS TRNSCRPJ: CPT

## 2019-11-13 PROCEDURE — 84466 ASSAY OF TRANSFERRIN: CPT

## 2019-11-13 RX ORDER — CLONIDINE HYDROCHLORIDE 0.1 MG/1
0.1 TABLET ORAL 2 TIMES DAILY
COMMUNITY
End: 2019-12-18 | Stop reason: SDUPTHER

## 2019-11-13 RX ORDER — TRAMADOL HYDROCHLORIDE 50 MG/1
TABLET ORAL
COMMUNITY
Start: 2019-11-01 | End: 2020-05-19

## 2019-11-13 NOTE — PROGRESS NOTES
: 1965    Chief Complaint   Patient presents with   • Hepatitis C       Brandon Dillon is a 54 y.o. male who presents to the office today as a consultation from [facility] Tejada for evaluation of Hepatitis C.    History of Present Illness:  He states that he was sent to [facility] by his PCP for evaluation of Hepatitis C initially but was then referred here by that provider. He was told that he has fibrosis score of F4 based on recent labs. He states that he has known about having Hepatitis C since prior to  and was in correction from  to this year. He has some RUQ abdominal discomfort and soreness, worse when leaning or lying on the right side. He denies abdominal distension or peripheral edema, no rectal bleeding, melena or vomiting. He did have 1 episode of confusion in [facility] a few months ago in which he forgot where he was. He did have his ammonia checked around that time and it was normal (2019). Denies any history of IVDU. He had a tattoo placed while in detention in approx  and thinks this is how he contracted hepatitis C. He thinks he was vaccinated previously for Hepatitis but is uncertain which type, thinks Hepatitis B. Denies any history of alcoholism and no current alcohol use. He denies any current illicit drug use. No family history of cirrhosis but has been told in the past that he has fatty liver. 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 He thinks he had colonoscopy in the past but unsure about the results. Also reports having EGD in remote past, had ulcers, but this was prior to his diagnosis of cirrhosis. Had some history of elevated iron which required phlebotomy for treatment per his report, he has not had iron checked recently.    He reports recent liver imaging at [facility] but unsure about those results.    Review of Systems   Constitutional: Positive for appetite change. Negative for chills, fatigue and fever.   HENT: Negative.   "  Gastrointestinal: Positive for abdominal pain and diarrhea. Negative for abdominal distention, anal bleeding, blood in stool, constipation, nausea and vomiting.   Endocrine: Negative.    Genitourinary: Negative for difficulty urinating.   Musculoskeletal: Negative for back pain and neck pain.   Skin: Negative for color change, pallor, rash and wound.   Allergic/Immunologic: Negative for environmental allergies and food allergies.   Neurological: Positive for seizures. Negative for dizziness, light-headedness and headaches.   Hematological: Does not bruise/bleed easily.   Psychiatric/Behavioral: Negative.        Past Medical History:   Diagnosis Date   • Acid reflux    • Anxiety    • Asthma     as child   • Cirrhosis (CMS/[facility])    • Depression    • History of substance abuse (CMS/[facility])     Clean since May 31st 2012.    • Hypertension    • Migraines    • Suicide attempt (CMS/[facility])     \"Years ago, I took a bunch of pills.\"       Past Surgical History:   Procedure Laterality Date   • FACIAL FRACTURE SURGERY         Family History   Problem Relation Age of Onset   • Dementia Mother    • Anxiety disorder Mother    • Heart disease Father    • Anxiety disorder Maternal Grandfather    • Depression Maternal Grandfather        Social History     Socioeconomic History   • Marital status:      Spouse name: Not on file   • Number of children: Not on file   • Years of education: Not on file   • Highest education level: Not on file   Tobacco Use   • Smoking status: Former Smoker     Packs/day: 0.50     Years: 2.00     Pack years: 1.00     Last attempt to quit:      Years since quittin.8   • Smokeless tobacco: Current User     Types: Snuff   • Tobacco comment: 1 can per day   Substance and Sexual Activity   • Alcohol use: No     Frequency: Never   • Drug use: No   • Sexual activity: Yes     Comment: unemployed,  has 2 sons/2 daughters       Current Outpatient Medications:   •  cloNIDine (CATAPRES) 0.1 MG " "tablet, Take 0.1 mg by mouth 2 (Two) Times a Day., Disp: , Rfl:   •  hydrochlorothiazide (HYDRODIURIL) 12.5 MG tablet, Take 12.5 mg by mouth Daily., Disp: , Rfl:   •  pantoprazole (PROTONIX) 20 MG EC tablet, Take 20 mg by mouth Daily., Disp: , Rfl:   •  propranolol (INDERAL) 80 MG tablet, Take 80 mg by mouth 2 (Two) Times a Day., Disp: , Rfl:   •  traMADol (ULTRAM) 50 MG tablet, , Disp: , Rfl:   •  albuterol sulfate  (90 Base) MCG/ACT inhaler, Inhale 2 puffs Every 4 (Four) Hours As Needed for Wheezing., Disp: , Rfl:     Allergies:   Sulfa antibiotics and Topamax [topiramate]    Vitals:  /86   Pulse 75   Ht 177.8 cm (70\")   Wt 90.7 kg (200 lb)   SpO2 97%   BMI 28.70 kg/m²     Physical Exam   Constitutional: He is oriented to person, place, and time. He appears well-developed and well-nourished. No distress.   HENT:   Head: Normocephalic and atraumatic.   Nose: Nose normal.   Mouth/Throat: Oropharynx is clear and moist.   Eyes: Conjunctivae are normal. Right eye exhibits no discharge. Left eye exhibits no discharge. No scleral icterus.   Neck: Normal range of motion. No JVD present.   Cardiovascular: Normal rate, regular rhythm and normal heart sounds. Exam reveals no gallop and no friction rub.   No murmur heard.  Pulmonary/Chest: Effort normal and breath sounds normal. No respiratory distress. He has no wheezes. He has no rales. He exhibits no tenderness.   Abdominal: Soft. Bowel sounds are normal. He exhibits no mass. There is tenderness (generalized, mild ).   Musculoskeletal: Normal range of motion. He exhibits no edema or deformity.   Neurological: He is alert and oriented to person, place, and time. Coordination normal.   Skin: Skin is warm and dry. No rash noted. He is not diaphoretic. No erythema.   Psychiatric: He has a normal mood and affect. His behavior is normal. Judgment and thought content normal.   Vitals reviewed.    Results Review:  Labs 9/9/2019 hemoglobin 14.5, hematocrit 43.2, " platelets 107,000, ALT 94, AST 71, alk phos 121, total bilirubin 0.8, , INR 1.1, creatinine 0.72, sodium 139, albumin 4.0, acute hepatitis panel shows positive hepatitis C antibody only, HIV negative, TSH normal, hepatitis A total IgM and IgG negative, hepatitis B surface antibodies total negative, F4 fibrosis score, HCV genotype 2B, hepatitis C viral load 53,700.    Assessment:  1. Chronic hepatitis C without hepatic coma (CMS/HCC)    2. History of tattoo      Plan:  Orders Placed This Encounter   Procedures   • AFP Tumor Marker   • CBC (No Diff)   • Iron Profile   • Hepatitis C RNA, Quantitative, PCR (graph)   • Comprehensive Metabolic Panel   • Urine Drug Screen - Urine, Clean Catch     Previous lab results were reviewed and discussed with him in detail. He will have updated viral load, CMP, CBC and AFP prior to consideration of treatment. He will also complete drug screen today. Iron panel will be checked as well due to unknown iron problem in his history. He will continue to abstain from alcohol and illicit drugs. More recommendations will be made after these results have been reviewed.     We will request recent liver imaging from [facility] for review.           Return in about 2 weeks (around 11/27/2019) for discussion of results.      Electronically signed 11/13/2019 12:48 PM  Sonia Vegas PA-C, [facility]

## 2019-11-15 ENCOUNTER — TELEPHONE (OUTPATIENT)
Dept: GASTROENTEROLOGY | Facility: CLINIC | Age: 54
End: 2019-11-15

## 2019-11-15 DIAGNOSIS — B18.2 CHRONIC HEPATITIS C WITHOUT HEPATIC COMA (HCC): Primary | ICD-10-CM

## 2019-11-15 LAB
HCV RNA SERPL NAA+PROBE-ACNC: NORMAL IU/ML
HCV RNA SERPL NAA+PROBE-LOG IU: 5.86 LOG10 IU/ML
TEST INFORMATION: NORMAL

## 2019-11-15 NOTE — TELEPHONE ENCOUNTER
Pt filled out MG to get liver imaging from [facility] at his visit this week, still need report but after reviewed, will send Rx for treatment. He has Hep C, genotype 2b, treatment naive with compensated cirrhosis (F4).     CP score 5, class A  MELD score 7

## 2019-11-18 ENCOUNTER — SPECIALTY PHARMACY (OUTPATIENT)
Dept: PHARMACY | Facility: HOSPITAL | Age: 54
End: 2019-11-18

## 2019-11-18 DIAGNOSIS — B18.2 CHRONIC HEPATITIS C WITHOUT HEPATIC COMA (HCC): ICD-10-CM

## 2019-11-18 NOTE — TELEPHONE ENCOUNTER
Pt has Hep C, genotype 2b, treatment naive with compensated cirrhosis (F4). He will have treatment with Mavyret for 8 weeks. I have prescribed.     Please also let him know that labs all ok that were collected last visit except elevated liver enzymes. Iron normal.     CP score 5, class A  MELD score 7

## 2019-11-21 NOTE — TELEPHONE ENCOUNTER
PA approval obtained and Mavyret is ready to dispense to patient in the apothecary.  Spoke with patient today on the phone to make him aware.  Patient stated he would  from apothecary this afternoon or tomorrow. He is aware of four week follow up appointment that is scheduled for 12/18/19.

## 2019-12-16 ENCOUNTER — SPECIALTY PHARMACY (OUTPATIENT)
Dept: PHARMACY | Facility: HOSPITAL | Age: 54
End: 2019-12-16

## 2019-12-18 ENCOUNTER — APPOINTMENT (OUTPATIENT)
Dept: CT IMAGING | Facility: HOSPITAL | Age: 54
End: 2019-12-18

## 2019-12-18 ENCOUNTER — APPOINTMENT (OUTPATIENT)
Dept: GENERAL RADIOLOGY | Facility: HOSPITAL | Age: 54
End: 2019-12-18

## 2019-12-18 ENCOUNTER — HOSPITAL ENCOUNTER (EMERGENCY)
Facility: HOSPITAL | Age: 54
Discharge: HOME | End: 2019-12-18
Attending: EMERGENCY MEDICINE | Admitting: EMERGENCY MEDICINE

## 2019-12-18 ENCOUNTER — OFFICE VISIT (OUTPATIENT)
Dept: PHARMACY | Facility: HOSPITAL | Age: 54
End: 2019-12-18

## 2019-12-18 ENCOUNTER — TELEPHONE (OUTPATIENT)
Dept: GASTROENTEROLOGY | Facility: CLINIC | Age: 54
End: 2019-12-18

## 2019-12-18 VITALS
OXYGEN SATURATION: 98 % | TEMPERATURE: 98 F | WEIGHT: 189 LBS | BODY MASS INDEX: 27.06 KG/M2 | DIASTOLIC BLOOD PRESSURE: 102 MMHG | HEART RATE: 72 BPM | RESPIRATION RATE: 18 BRPM | SYSTOLIC BLOOD PRESSURE: 164 MMHG | HEIGHT: 70 IN

## 2019-12-18 VITALS
BODY MASS INDEX: 28.63 KG/M2 | HEIGHT: 70 IN | HEART RATE: 63 BPM | SYSTOLIC BLOOD PRESSURE: 194 MMHG | WEIGHT: 200 LBS | DIASTOLIC BLOOD PRESSURE: 105 MMHG | OXYGEN SATURATION: 99 %

## 2019-12-18 DIAGNOSIS — K08.89 PAIN, DENTAL: ICD-10-CM

## 2019-12-18 DIAGNOSIS — I10 HYPERTENSION, UNSPECIFIED TYPE: ICD-10-CM

## 2019-12-18 DIAGNOSIS — B18.2 CHRONIC HEPATITIS C WITHOUT HEPATIC COMA (HCC): Primary | ICD-10-CM

## 2019-12-18 DIAGNOSIS — R77.2 ELEVATED AFP: ICD-10-CM

## 2019-12-18 DIAGNOSIS — K74.60 CIRRHOSIS OF LIVER WITHOUT ASCITES, UNSPECIFIED HEPATIC CIRRHOSIS TYPE (HCC): ICD-10-CM

## 2019-12-18 DIAGNOSIS — I10 HYPERTENSION, UNSPECIFIED TYPE: Primary | ICD-10-CM

## 2019-12-18 LAB
ALBUMIN SERPL-MCNC: 3.96 G/DL (ref 3.5–5.2)
ALBUMIN/GLOB SERPL: 1.2 G/DL
ALP SERPL-CCNC: 82 U/L (ref 39–117)
ALT SERPL W P-5'-P-CCNC: 18 U/L (ref 1–41)
ANION GAP SERPL CALCULATED.3IONS-SCNC: 11.1 MMOL/L (ref 5–15)
AST SERPL-CCNC: 24 U/L (ref 1–40)
BASOPHILS # BLD AUTO: 0.05 10*3/MM3 (ref 0–0.2)
BASOPHILS NFR BLD AUTO: 0.8 % (ref 0–1.5)
BILIRUB SERPL-MCNC: 1 MG/DL (ref 0.2–1.2)
BILIRUB UR QL STRIP: NEGATIVE
BUN BLD-MCNC: 9 MG/DL (ref 6–20)
BUN/CREAT SERPL: 13.4 (ref 7–25)
CALCIUM SPEC-SCNC: 9.4 MG/DL (ref 8.6–10.5)
CHLORIDE SERPL-SCNC: 104 MMOL/L (ref 98–107)
CLARITY UR: CLEAR
CO2 SERPL-SCNC: 26.9 MMOL/L (ref 22–29)
COLOR UR: YELLOW
CREAT BLD-MCNC: 0.67 MG/DL (ref 0.76–1.27)
DEPRECATED RDW RBC AUTO: 43.1 FL (ref 37–54)
EOSINOPHIL # BLD AUTO: 0.49 10*3/MM3 (ref 0–0.4)
EOSINOPHIL NFR BLD AUTO: 7.5 % (ref 0.3–6.2)
ERYTHROCYTE [DISTWIDTH] IN BLOOD BY AUTOMATED COUNT: 12.8 % (ref 12.3–15.4)
GFR SERPL CREATININE-BSD FRML MDRD: 125 ML/MIN/1.73
GLOBULIN UR ELPH-MCNC: 3.4 GM/DL
GLUCOSE BLD-MCNC: 102 MG/DL (ref 65–99)
GLUCOSE UR STRIP-MCNC: NEGATIVE MG/DL
HCT VFR BLD AUTO: 41 % (ref 37.5–51)
HGB BLD-MCNC: 14.1 G/DL (ref 13–17.7)
HGB UR QL STRIP.AUTO: NEGATIVE
IMM GRANULOCYTES # BLD AUTO: 0.01 10*3/MM3 (ref 0–0.05)
IMM GRANULOCYTES NFR BLD AUTO: 0.2 % (ref 0–0.5)
KETONES UR QL STRIP: NEGATIVE
LEUKOCYTE ESTERASE UR QL STRIP.AUTO: NEGATIVE
LYMPHOCYTES # BLD AUTO: 2.07 10*3/MM3 (ref 0.7–3.1)
LYMPHOCYTES NFR BLD AUTO: 31.7 % (ref 19.6–45.3)
MAGNESIUM SERPL-MCNC: 1.7 MG/DL (ref 1.6–2.6)
MCH RBC QN AUTO: 31.4 PG (ref 26.6–33)
MCHC RBC AUTO-ENTMCNC: 34.4 G/DL (ref 31.5–35.7)
MCV RBC AUTO: 91.3 FL (ref 79–97)
MONOCYTES # BLD AUTO: 0.44 10*3/MM3 (ref 0.1–0.9)
MONOCYTES NFR BLD AUTO: 6.7 % (ref 5–12)
NEUTROPHILS # BLD AUTO: 3.47 10*3/MM3 (ref 1.7–7)
NEUTROPHILS NFR BLD AUTO: 53.1 % (ref 42.7–76)
NITRITE UR QL STRIP: NEGATIVE
NRBC BLD AUTO-RTO: 0 /100 WBC (ref 0–0.2)
PH UR STRIP.AUTO: 7 [PH] (ref 5–8)
PLATELET # BLD AUTO: 106 10*3/MM3 (ref 140–450)
PMV BLD AUTO: 11.5 FL (ref 6–12)
POTASSIUM BLD-SCNC: 3.5 MMOL/L (ref 3.5–5.2)
PROT SERPL-MCNC: 7.4 G/DL (ref 6–8.5)
PROT UR QL STRIP: NEGATIVE
RBC # BLD AUTO: 4.49 10*6/MM3 (ref 4.14–5.8)
SODIUM BLD-SCNC: 142 MMOL/L (ref 136–145)
SP GR UR STRIP: 1.02 (ref 1–1.03)
TROPONIN T SERPL-MCNC: <0.01 NG/ML (ref 0–0.03)
UROBILINOGEN UR QL STRIP: NORMAL
WBC NRBC COR # BLD: 6.53 10*3/MM3 (ref 3.4–10.8)

## 2019-12-18 PROCEDURE — 84484 ASSAY OF TROPONIN QUANT: CPT | Performed by: PHYSICIAN ASSISTANT

## 2019-12-18 PROCEDURE — 80053 COMPREHEN METABOLIC PANEL: CPT | Performed by: PHYSICIAN ASSISTANT

## 2019-12-18 PROCEDURE — 99214 OFFICE O/P EST MOD 30 MIN: CPT | Performed by: PHYSICIAN ASSISTANT

## 2019-12-18 PROCEDURE — 83735 ASSAY OF MAGNESIUM: CPT | Performed by: PHYSICIAN ASSISTANT

## 2019-12-18 PROCEDURE — 93005 ELECTROCARDIOGRAM TRACING: CPT | Performed by: PHYSICIAN ASSISTANT

## 2019-12-18 PROCEDURE — 93010 ELECTROCARDIOGRAM REPORT: CPT | Performed by: INTERNAL MEDICINE

## 2019-12-18 PROCEDURE — 70450 CT HEAD/BRAIN W/O DYE: CPT | Performed by: RADIOLOGY

## 2019-12-18 PROCEDURE — 71045 X-RAY EXAM CHEST 1 VIEW: CPT

## 2019-12-18 PROCEDURE — 71045 X-RAY EXAM CHEST 1 VIEW: CPT | Performed by: RADIOLOGY

## 2019-12-18 PROCEDURE — 99285 EMERGENCY DEPT VISIT HI MDM: CPT

## 2019-12-18 PROCEDURE — 81003 URINALYSIS AUTO W/O SCOPE: CPT | Performed by: PHYSICIAN ASSISTANT

## 2019-12-18 PROCEDURE — 70450 CT HEAD/BRAIN W/O DYE: CPT

## 2019-12-18 PROCEDURE — 85025 COMPLETE CBC W/AUTO DIFF WBC: CPT | Performed by: PHYSICIAN ASSISTANT

## 2019-12-18 RX ORDER — CLONIDINE HYDROCHLORIDE 0.1 MG/1
0.1 TABLET ORAL 2 TIMES DAILY
Qty: 30 TABLET | Refills: 0 | Status: SHIPPED | OUTPATIENT
Start: 2019-12-18 | End: 2020-05-19 | Stop reason: ALTCHOICE

## 2019-12-18 RX ORDER — AMOXICILLIN 500 MG/1
500 CAPSULE ORAL 3 TIMES DAILY
Qty: 30 CAPSULE | Refills: 0 | Status: SHIPPED | OUTPATIENT
Start: 2019-12-18 | End: 2019-12-28

## 2019-12-18 RX ORDER — CLONIDINE HYDROCHLORIDE 0.1 MG/1
0.1 TABLET ORAL ONCE
Status: COMPLETED | OUTPATIENT
Start: 2019-12-18 | End: 2019-12-18

## 2019-12-18 RX ORDER — SODIUM CHLORIDE 0.9 % (FLUSH) 0.9 %
10 SYRINGE (ML) INJECTION AS NEEDED
Status: DISCONTINUED | OUTPATIENT
Start: 2019-12-18 | End: 2019-12-18 | Stop reason: HOSPADM

## 2019-12-18 RX ADMIN — CLONIDINE HYDROCHLORIDE 0.1 MG: 0.1 TABLET ORAL at 13:31

## 2019-12-18 NOTE — ED PROVIDER NOTES
Subjective   54-year-old male who presents to the ED today for elevated blood pressure.  He states he was at the hepatitis clinic and they told him to come to the ER for an evaluation due to his blood pressure.  His blood pressure was 182/110 at the clinic.  He states his blood pressure normally runs in the 130s over 80s.  He states he has been taking his blood pressure medicine as prescribed.  He states he took it at 6 AM today.  He states he has been having issues with dental pain and is unsure if this may be the cause of his elevated blood pressure.  He states he has had a right lower tooth that has been painful for the last 3 weeks.  He denies any fever.  He denies any nausea or vomiting.  He has been taking ibuprofen, Tylenol and Orajel for his symptoms.  He states he is trying to have his teeth removed so that he can get dentures.  He states he has been unable to find a dentist in the area that will accept his insurance.      History provided by:  Patient  Hypertension   Severity:  Moderate  Onset quality:  Gradual  Duration: several weeks but he is not exactly sure.  Timing:  Constant  Progression:  Unchanged  Chronicity:  New  Time since last dose of antihypertensive:  7 hours  Notable PTA blood pressures:  182/110  Context: not drug abuse and not noncompliance    Relieved by:  Nothing  Worsened by:  Nothing  Associated symptoms: headaches    Associated symptoms: no abdominal pain, no anxiety, no blurred vision, no chest pain, no confusion, no dizziness, no ear pain, no epistaxis, no fatigue, no fever, no hematuria, no hypokalemia, no loss of consciousness, no nausea, no neck pain, no palpitations, no peripheral edema, no shortness of breath, no syncope, no tinnitus, not vomiting and no weakness        Review of Systems   Constitutional: Negative for fatigue and fever.   HENT: Positive for dental problem. Negative for ear pain, nosebleeds and tinnitus.    Eyes: Negative.  Negative for blurred vision.  "  Respiratory: Negative for shortness of breath.    Cardiovascular: Negative for chest pain, palpitations and syncope.   Gastrointestinal: Negative for abdominal pain, nausea and vomiting.   Genitourinary: Negative for hematuria.   Musculoskeletal: Negative for neck pain.   Skin: Negative.    Neurological: Positive for headaches. Negative for dizziness, loss of consciousness and weakness.   Psychiatric/Behavioral: Negative for confusion. The patient is not nervous/anxious.    All other systems reviewed and are negative.      Past Medical History:   Diagnosis Date   • Acid reflux    • Anxiety    • Asthma     as child   • Cirrhosis (CMS/[facility])    • Depression    • History of substance abuse (CMS/[facility])     Clean since May 31st 2012.    • Hypertension    • Migraines    • Suicide attempt (CMS/[facility])     \"Years ago, I took a bunch of pills.\"       Allergies   Allergen Reactions   • Sulfa Antibiotics Hives, Shortness Of Breath and Swelling   • Topamax [Topiramate] Dermatitis       Past Surgical History:   Procedure Laterality Date   • FACIAL FRACTURE SURGERY  2017       Family History   Problem Relation Age of Onset   • Dementia Mother    • Anxiety disorder Mother    • Heart disease Father    • Anxiety disorder Maternal Grandfather    • Depression Maternal Grandfather        Social History     Socioeconomic History   • Marital status:      Spouse name: Not on file   • Number of children: Not on file   • Years of education: Not on file   • Highest education level: Not on file   Tobacco Use   • Smoking status: Former Smoker     Packs/day: 0.50     Years: 2.00     Pack years: 1.00     Last attempt to quit: 2012     Years since quittin.9   • Smokeless tobacco: Current User     Types: Snuff   • Tobacco comment: 1 can per day   Substance and Sexual Activity   • Alcohol use: No     Frequency: Never   • Drug use: No   • Sexual activity: Yes     Comment: unemployed,  has 2 sons/2 daughters           Objective "   Physical Exam   Constitutional: He is oriented to person, place, and time. He appears well-developed and well-nourished. No distress.   HENT:   Head: Normocephalic and atraumatic.   Right Ear: External ear normal.   Left Ear: External ear normal.   Nose: Nose normal.   Mouth/Throat: No trismus in the jaw. Dental caries present. No dental abscesses. No oropharyngeal exudate.       Diffuse dental caries with multiple missing teeth   Eyes: Pupils are equal, round, and reactive to light. Conjunctivae and EOM are normal.   Neck: Normal range of motion. Neck supple.   Cardiovascular: Normal rate, regular rhythm, normal heart sounds and intact distal pulses.   Pulmonary/Chest: Effort normal and breath sounds normal.   Abdominal: Soft. Bowel sounds are normal.   Musculoskeletal: Normal range of motion.   Neurological: He is alert and oriented to person, place, and time. No cranial nerve deficit or sensory deficit. He exhibits normal muscle tone. Coordination normal.   Skin: Skin is warm and dry. Capillary refill takes less than 2 seconds.   Psychiatric: He has a normal mood and affect. His behavior is normal. Judgment and thought content normal.   Nursing note and vitals reviewed.      Procedures           ED Course  ED Course as of Dec 18 1642   Wed Dec 18, 2019   1342 EKG performed at 13:23  Sinus rhythm, rate of 69  No acute ischemia  Reviewed by Dr. Lozada    []   7655 FINDINGS:   Today's study shows no mass, hemorrhage, or midline shift.   The ventricles, cisterns, and sulci are unremarkable. There is no  hydrocephalus.   There is no evidence of acute ischemia.  I do not see epidural or subdural hematoma.  The gray-white differentiation is appropriate.   The bone window setting images show no destructive calvarial lesion or  acute calvarial fracture.   The posterior fossa is unremarkable.        IMPRESSION:  No acute intracranial pathology. Nothing is seen on this exam to  specifically account for the patient's  symptoms.   CT Head Without Contrast []   1451 BP currently 158/110 manual    [AH]   1509    FINDINGS:     There is no focal alveolar infiltrate or effusion.  The cardiac silhouette is normal. The pulmonary vasculature is  unremarkable.  There is no evidence of an acute osseous abnormality.   There are no suspicious-appearing parenchymal soft tissue nodules.        IMPRESSION:  No evidence of active or acute cardiopulmonary disease on today's chest  radiograph.   XR Chest 1 View []   1515 Patient's blood pressure is improving.  He denies any complaints at this time.  He states he only has 6 Clonidine left and he is supposed to take them twice a day, however he does not have a follow up appointment until 1/3.  Will provide prescription for Clonidine.  Will also start on antibiotics and tooth balls for his dental pain.  He was given options for follow up on his discharge papers.  He will return to the ED as needed.    []      ED Course User Index  [] Divina Hoff PA                      No data recorded                        MDM  Number of Diagnoses or Management Options  Hypertension, unspecified type:   Pain, dental:      Amount and/or Complexity of Data Reviewed  Clinical lab tests: reviewed  Tests in the radiology section of CPT®: reviewed  Tests in the medicine section of CPT®: reviewed  Discuss the patient with other providers: yes (Dr. Lozada)    Patient Progress  Patient progress: improved      Final diagnoses:   Hypertension, unspecified type   Pain, dental              Divina Hoff PA  12/18/19 7854

## 2019-12-18 NOTE — TELEPHONE ENCOUNTER
Unfortunately, they did not collected ordered Hep C monitoring labs at ED as hoped. He will need to come back to have those completed as soon as he can. I tried ordering on labs already collected but it was not able to be added on. Thanks.

## 2019-12-18 NOTE — PROGRESS NOTES
": 1965    Chief Complaint   Patient presents with   • Hepatitis C       Brandon Dillon is a 54 y.o. male who presents to the office today as a follow up appointment regarding Hepatitis C.    History of Present Illness:  Patient reports that he has been taking Mavyret for treatment of Hepatitis C exactly as prescribed for the past 4 weeks. Denies any noticeable side effects with the medication. Currently denies any nausea, vomiting, headache or worsening fatigue. He does have intermittent issues with his blood pressure which is elevated today. He drove a motorcycle to his appointment and is alone today. He took his medication as directed already today. Denies any current illicit drug use or alcohol use.     Review of Systems   Constitutional: Positive for appetite change. Negative for chills, fatigue and fever.   Gastrointestinal: Negative for abdominal distention, abdominal pain, anal bleeding, blood in stool, constipation, diarrhea, nausea and vomiting.   Endocrine: Negative.    Genitourinary: Negative for difficulty urinating.   Musculoskeletal: Negative for back pain and neck pain.   Skin: Negative for color change, pallor, rash and wound.   Allergic/Immunologic: Negative for environmental allergies and food allergies.   Neurological: Positive for seizures and headaches. Negative for dizziness and light-headedness.   Hematological: Does not bruise/bleed easily.   Psychiatric/Behavioral: Negative.      I have reviewed the ROS as documented by the MA/LPN/RN Sonia Vegas PA-C    Past Medical History:   Diagnosis Date   • Acid reflux    • Anxiety    • Asthma     as child   • Cirrhosis (CMS/HCC)    • Depression    • History of substance abuse (CMS/HCC)     Clean since May 31st 2012.    • Hypertension    • Migraines    • Suicide attempt (CMS/HCC)     \"Years ago, I took a bunch of pills.\"       Past Surgical History:   Procedure Laterality Date   • FACIAL FRACTURE SURGERY  2017       Family History   Problem " "Relation Age of Onset   • Dementia Mother    • Anxiety disorder Mother    • Heart disease Father    • Anxiety disorder Maternal Grandfather    • Depression Maternal Grandfather        Social History     Socioeconomic History   • Marital status:      Spouse name: Not on file   • Number of children: Not on file   • Years of education: Not on file   • Highest education level: Not on file   Tobacco Use   • Smoking status: Former Smoker     Packs/day: 0.50     Years: 2.00     Pack years: 1.00     Last attempt to quit:      Years since quittin.0   • Smokeless tobacco: Current User     Types: Snuff   • Tobacco comment: 1 can per day   Substance and Sexual Activity   • Alcohol use: No     Frequency: Never   • Drug use: No   • Sexual activity: Yes     Comment: unemployed,  has 2 sons/2 daughters       Current Outpatient Medications:   •  albuterol sulfate  (90 Base) MCG/ACT inhaler, Inhale 2 puffs Every 4 (Four) Hours As Needed for Wheezing., Disp: , Rfl:   •  cloNIDine (CATAPRES) 0.1 MG tablet, Take 0.1 mg by mouth 2 (Two) Times a Day., Disp: , Rfl:   •  Glecaprevir-Pibrentasvir (MAVYRET) 100-40 MG tablet, Take 3 tablets by mouth Daily for 56 days., Disp: 84 tablet, Rfl: 1  •  hydrochlorothiazide (HYDRODIURIL) 12.5 MG tablet, Take 12.5 mg by mouth Daily., Disp: , Rfl:   •  pantoprazole (PROTONIX) 20 MG EC tablet, Take 20 mg by mouth Daily., Disp: , Rfl:   •  propranolol (INDERAL) 80 MG tablet, Take 80 mg by mouth 2 (Two) Times a Day., Disp: , Rfl:   •  traMADol (ULTRAM) 50 MG tablet, , Disp: , Rfl:     Allergies:   Sulfa antibiotics and Topamax [topiramate]    Vitals:  BP (!) 194/105 (BP Location: Left arm, Patient Position: Sitting, Cuff Size: Adult)   Pulse 63   Ht 177.8 cm (70\")   Wt 90.7 kg (200 lb)   SpO2 99%   BMI 28.70 kg/m²   184/110 manual BP    Physical Exam   Constitutional: He is oriented to person, place, and time. He appears well-developed and well-nourished. No distress. "   HENT:   Head: Normocephalic and atraumatic.   Nose: Nose normal.   Mouth/Throat: Oropharynx is clear and moist.   Eyes: Conjunctivae are normal. Right eye exhibits no discharge. Left eye exhibits no discharge. No scleral icterus.   Neck: Normal range of motion. No JVD present.   Pulmonary/Chest: Effort normal.   Musculoskeletal: Normal range of motion. He exhibits no edema or deformity.   Neurological: He is alert and oriented to person, place, and time. Coordination normal.   Skin: No rash noted. He is not diaphoretic. No erythema.   Psychiatric: He has a normal mood and affect. His behavior is normal. Judgment and thought content normal.   Vitals reviewed.    Results Review:  Labs 9/9/2019 hemoglobin 14.5, hematocrit 43.2, platelets 107,000, ALT 94, AST 71, alk phos 121, total bilirubin 0.8, , INR 1.1, creatinine 0.72, sodium 139, albumin 4.0, acute hepatitis panel shows positive hepatitis C antibody only, HIV negative, TSH normal, hepatitis A total IgM and IgG negative, hepatitis B surface antibodies total negative, F4 fibrosis score, HCV genotype 2B, hepatitis C viral load 53,700.     Assessment:  1. Chronic hepatitis C without hepatic coma (CMS/HCC)    2. Cirrhosis of liver without ascites, unspecified hepatic cirrhosis type (CMS/HCC)    3. Hypertension, unspecified type    4. Elevated AFP      Plan:  Orders Placed This Encounter   Procedures   • US Liver   • Hepatitis C RNA, Quantitative, PCR (graph)   • Comprehensive Metabolic Panel     He will continue taking Mavyret for Hep C treatment for total duration of 8 weeks. He will take this medication exactly as prescribed. He will have labs today for monitoring and will have more labs at completion of therapy and again 3 months s/p therapy completion for confirmation of cure. He will continue to abstain from alcohol or illicit drug use.    Recommended that he report to ED now due to hypertension, worsening during visit.     CP score 5, class A  MELD  score 7        Follow up after final labs have been completed.       Electronically signed 12/30/2019 1:09 PM  Sonia Vegas PA-C, [facility]

## 2019-12-27 ENCOUNTER — LAB (OUTPATIENT)
Dept: PHARMACY | Facility: HOSPITAL | Age: 54
End: 2019-12-27

## 2019-12-27 DIAGNOSIS — B18.2 CHRONIC HEPATITIS C WITHOUT HEPATIC COMA (HCC): ICD-10-CM

## 2019-12-27 PROCEDURE — 36415 COLL VENOUS BLD VENIPUNCTURE: CPT

## 2019-12-27 PROCEDURE — 80053 COMPREHEN METABOLIC PANEL: CPT

## 2019-12-27 PROCEDURE — 87522 HEPATITIS C REVRS TRNSCRPJ: CPT

## 2019-12-28 LAB
ALBUMIN SERPL-MCNC: 4.3 G/DL (ref 3.5–5.2)
ALBUMIN/GLOB SERPL: 1.2 G/DL
ALP SERPL-CCNC: 105 U/L (ref 39–117)
ALT SERPL W P-5'-P-CCNC: 17 U/L (ref 1–41)
ANION GAP SERPL CALCULATED.3IONS-SCNC: 15.5 MMOL/L (ref 5–15)
AST SERPL-CCNC: 28 U/L (ref 1–40)
BILIRUB SERPL-MCNC: 0.7 MG/DL (ref 0.2–1.2)
BUN BLD-MCNC: 10 MG/DL (ref 6–20)
BUN/CREAT SERPL: 13.7 (ref 7–25)
CALCIUM SPEC-SCNC: 9.6 MG/DL (ref 8.6–10.5)
CHLORIDE SERPL-SCNC: 101 MMOL/L (ref 98–107)
CO2 SERPL-SCNC: 23.5 MMOL/L (ref 22–29)
CREAT BLD-MCNC: 0.73 MG/DL (ref 0.76–1.27)
GFR SERPL CREATININE-BSD FRML MDRD: 113 ML/MIN/1.73
GLOBULIN UR ELPH-MCNC: 3.5 GM/DL
GLUCOSE BLD-MCNC: 86 MG/DL (ref 65–99)
POTASSIUM BLD-SCNC: 3.9 MMOL/L (ref 3.5–5.2)
PROT SERPL-MCNC: 7.8 G/DL (ref 6–8.5)
SODIUM BLD-SCNC: 140 MMOL/L (ref 136–145)

## 2019-12-30 ENCOUNTER — TELEPHONE (OUTPATIENT)
Dept: GASTROENTEROLOGY | Facility: CLINIC | Age: 54
End: 2019-12-30

## 2019-12-30 DIAGNOSIS — B18.2 CHRONIC HEPATITIS C WITHOUT HEPATIC COMA (HCC): Primary | ICD-10-CM

## 2019-12-30 LAB
HCV RNA SERPL NAA+PROBE-ACNC: NORMAL IU/ML
TEST INFORMATION: NORMAL

## 2020-01-03 NOTE — PROGRESS NOTES
"      Specialty Pharmacy Note      Name:  Brandon Dillon  :  1965  Date:  2019         Past Medical History:   Diagnosis Date   • Acid reflux    • Anxiety    • Asthma     as child   • Cirrhosis (CMS/[facility])    • Depression    • History of substance abuse (CMS/[facility])     Clean since May 31st 2012.    • Hypertension    • Migraines    • Suicide attempt (CMS/[facility])     \"Years ago, I took a bunch of pills.\"       Past Surgical History:   Procedure Laterality Date   • FACIAL FRACTURE SURGERY  2017       Social History     Socioeconomic History   • Marital status:      Spouse name: Not on file   • Number of children: Not on file   • Years of education: Not on file   • Highest education level: Not on file   Tobacco Use   • Smoking status: Former Smoker     Packs/day: 0.50     Years: 2.00     Pack years: 1.00     Last attempt to quit:      Years since quittin.0   • Smokeless tobacco: Current User     Types: Snuff   • Tobacco comment: 1 can per day   Substance and Sexual Activity   • Alcohol use: No     Frequency: Never   • Drug use: No   • Sexual activity: Yes     Comment: unemployed,  has 2 sons/2 daughters       Family History   Problem Relation Age of Onset   • Dementia Mother    • Anxiety disorder Mother    • Heart disease Father    • Anxiety disorder Maternal Grandfather    • Depression Maternal Grandfather        Allergies   Allergen Reactions   • Sulfa Antibiotics Hives, Shortness Of Breath and Swelling   • Topamax [Topiramate] Dermatitis       Current Outpatient Medications   Medication Sig Dispense Refill   • albuterol sulfate  (90 Base) MCG/ACT inhaler Inhale 2 puffs Every 4 (Four) Hours As Needed for Wheezing.     • cloNIDine (CATAPRES) 0.1 MG tablet Take 1 tablet by mouth 2 (Two) Times a Day. 30 tablet 0   • Glecaprevir-Pibrentasvir (MAVYRET) 100-40 MG tablet Take 3 tablets by mouth Daily for 56 days. 84 tablet 1   • hydrochlorothiazide (HYDRODIURIL) 12.5 MG tablet Take 12.5 " mg by mouth Daily.     • pantoprazole (PROTONIX) 20 MG EC tablet Take 20 mg by mouth Daily.     • propranolol (INDERAL) 80 MG tablet Take 80 mg by mouth 2 (Two) Times a Day.     • traMADol (ULTRAM) 50 MG tablet        No current facility-administered medications for this visit.          LABORATORY:    Lab Results   Component Value Date    IRON 95 11/13/2019    TIBC 463 11/13/2019     No results found for: PROTIME, INR, PTT  Lab Results   Component Value Date    PDYOLR52 5.862 11/13/2019     Lab Results   Component Value Date    AMPHETSCREEN Negative 11/13/2019    BARBITSCNUR Negative 11/13/2019    LABBENZSCN Negative 11/13/2019    COCAINEUR Negative 11/13/2019    LABMETHSCN Negative 11/13/2019    OXYCODONESCN Negative 11/13/2019    6ACETYLMORP Negative 11/13/2019    BUPRENORSCNU Negative 11/13/2019    LABOPIASCN Negative 11/13/2019    PCPUR Negative 11/13/2019    THCURSCR Negative 11/13/2019     Last Urine Toxicity     LAST URINE TOXICITY RESULTS Latest Ref Rng & Units 11/13/2019 5/14/2019    AMPHETAMINES SCREEN, URINE Negative Negative Negative    BARBITURATES SCREEN Negative Negative Negative    BENZODIAZEPINE SCREEN, URINE Negative Negative Negative    BUPRENORPHINEUR Negative Negative Negative    COCAINE SCREEN, URINE Negative Negative Negative    METHADONE SCREEN, URINE Negative Negative Negative          ASSESSMENT/PLAN:    Patient Update Assessment (new medications, allergies, medical history): Assessed, no changes.     Medication(s): Mavyret 100-40 mg tablets for the treatment of Hepatitis C.    Currently Taking Medication(s): Yes.  Patient taking medication as directed.     Experiencing Side Effects: Patient denies any noticeable side effects with medication.     Prior Authorization Status: Approved.     Financial Assistance Status: Assistance is not needed at this time.     Any Issues Identified: No issues expressed from patient, no issues processing refill.     Appropriate to Process Prescription(s): Yes.   Medication will be dispensed from [facility].    Counseling Offered: Patient counseled with initial fill, aware to contact pharmacy with any questions or concerns.    Next Specialty Pharmacy Visit: Not needed.  This refill is for the last four weeks of an eight week medication regimen.  Specialty pharmacy will follow as needed.

## 2020-01-05 ENCOUNTER — APPOINTMENT (OUTPATIENT)
Dept: GENERAL RADIOLOGY | Facility: HOSPITAL | Age: 55
End: 2020-01-05

## 2020-01-05 ENCOUNTER — HOSPITAL ENCOUNTER (EMERGENCY)
Facility: HOSPITAL | Age: 55
Discharge: HOME | End: 2020-01-05
Attending: FAMILY MEDICINE | Admitting: FAMILY MEDICINE

## 2020-01-05 VITALS
BODY MASS INDEX: 27.06 KG/M2 | OXYGEN SATURATION: 97 % | DIASTOLIC BLOOD PRESSURE: 77 MMHG | WEIGHT: 189 LBS | SYSTOLIC BLOOD PRESSURE: 137 MMHG | HEART RATE: 90 BPM | TEMPERATURE: 97.6 F | HEIGHT: 70 IN | RESPIRATION RATE: 18 BRPM

## 2020-01-05 DIAGNOSIS — S62.309A CLOSED FRACTURE OF METACARPAL BONE, UNSPECIFIED FRACTURE MORPHOLOGY, UNSPECIFIED METACARPAL, UNSPECIFIED PORTION OF METACARPAL, INITIAL ENCOUNTER: Primary | ICD-10-CM

## 2020-01-05 PROCEDURE — 99282 EMERGENCY DEPT VISIT SF MDM: CPT

## 2020-01-05 PROCEDURE — 73120 X-RAY EXAM OF HAND: CPT | Performed by: RADIOLOGY

## 2020-01-05 PROCEDURE — 73110 X-RAY EXAM OF WRIST: CPT

## 2020-01-05 PROCEDURE — 73130 X-RAY EXAM OF HAND: CPT

## 2020-01-05 PROCEDURE — 73110 X-RAY EXAM OF WRIST: CPT | Performed by: RADIOLOGY

## 2020-01-05 NOTE — ED PROVIDER NOTES
"Subjective   Patient reports he was guiding cows into a barn and got his hand caught between cow and door.      History provided by:  Patient   used: No    Hand Injury   Location:  Hand  Hand location:  R hand  Injury: yes    Time since incident:  4 days  Pain details:     Quality:  Aching    Radiates to:  R forearm    Severity:  Moderate    Onset quality:  Sudden    Duration:  4 days    Timing:  Constant    Progression:  Unchanged  Handedness:  Right-handed  Dislocation: no    Foreign body present:  No foreign bodies  Tetanus status:  Up to date  Prior injury to area:  No  Worsened by:  Movement  Ineffective treatments:  Rest and elevation  Associated symptoms: swelling    Associated symptoms: no back pain, no decreased range of motion, no fatigue and no muscle weakness    Risk factors: no concern for non-accidental trauma, no frequent fractures and no recent illness        Review of Systems   Constitutional: Negative.  Negative for fatigue.   HENT: Negative.    Eyes: Negative.    Respiratory: Negative.    Cardiovascular: Negative.    Gastrointestinal: Negative.    Endocrine: Negative.    Genitourinary: Negative.    Musculoskeletal: Negative.  Negative for back pain.   Skin: Negative.    Allergic/Immunologic: Negative.    Neurological: Negative.    Hematological: Negative.    Psychiatric/Behavioral: Negative.        Past Medical History:   Diagnosis Date   • Acid reflux    • Anxiety    • Asthma     as child   • Cirrhosis (CMS/[facility]) 2003   • Depression    • History of substance abuse (CMS/[facility])     Clean since May 31st 2012.    • Hypertension    • Migraines    • Suicide attempt (CMS/[facility])     \"Years ago, I took a bunch of pills.\"       Allergies   Allergen Reactions   • Sulfa Antibiotics Hives, Shortness Of Breath and Swelling   • Topamax [Topiramate] Dermatitis       Past Surgical History:   Procedure Laterality Date   • FACIAL FRACTURE SURGERY  2017       Family History   Problem Relation Age of " Onset   • Dementia Mother    • Anxiety disorder Mother    • Heart disease Father    • Anxiety disorder Maternal Grandfather    • Depression Maternal Grandfather        Social History     Socioeconomic History   • Marital status:      Spouse name: Not on file   • Number of children: Not on file   • Years of education: Not on file   • Highest education level: Not on file   Tobacco Use   • Smoking status: Former Smoker     Packs/day: 0.50     Years: 2.00     Pack years: 1.00     Last attempt to quit:      Years since quittin.0   • Smokeless tobacco: Current User     Types: Snuff   • Tobacco comment: 1 can per day   Substance and Sexual Activity   • Alcohol use: No     Frequency: Never   • Drug use: No   • Sexual activity: Yes     Comment: unemployed,  has 2 sons/2 daughters           Objective   Physical Exam   Constitutional: He is oriented to person, place, and time. He appears well-developed and well-nourished.   HENT:   Head: Normocephalic.   Right Ear: External ear normal.   Left Ear: External ear normal.   Mouth/Throat: Oropharynx is clear and moist.   Eyes: Pupils are equal, round, and reactive to light. Conjunctivae and EOM are normal.   Neck: Normal range of motion. Neck supple.   Cardiovascular: Normal rate, regular rhythm, normal heart sounds and intact distal pulses.   Pulmonary/Chest: Effort normal and breath sounds normal.   Abdominal: Soft. Bowel sounds are normal.   Musculoskeletal:        Right hand: He exhibits decreased range of motion and tenderness.        Hands:  Neurological: He is alert and oriented to person, place, and time.   Skin: Skin is warm and dry. Capillary refill takes less than 2 seconds.   Psychiatric: He has a normal mood and affect. His behavior is normal. Thought content normal.   Nursing note and vitals reviewed.      Procedures           ED Course  ED Course as of 908   Sun 2020   1600 Contacted Dr. Perry. Advised that we splint and have  patient follow up.     [LEONOR]      ED Course User Index  [LEONOR] Keron Spencer, APRN                                               [facility]    Final diagnoses:   Closed fracture of metacarpal bone, unspecified fracture morphology, unspecified metacarpal, unspecified portion of metacarpal, initial encounter            Keron Spencer, RAZA  01/09/20 0906       Keron Spencer, RAZA  01/09/20 0908

## 2020-01-15 NOTE — TELEPHONE ENCOUNTER
Tried calling patient to let them know. Didn't talk to them, the phone number when straight to voicemail. Will try again.

## 2020-01-20 NOTE — TELEPHONE ENCOUNTER
Still unable to leave message for patient to return call to clinic.  He should be close to medication therpay completion so I am sending out letter to patient today to make him aware of next appointment.  Also, let him know that we need to update his contact information asap.

## 2020-01-23 ENCOUNTER — LAB (OUTPATIENT)
Dept: PHARMACY | Facility: HOSPITAL | Age: 55
End: 2020-01-23

## 2020-01-23 DIAGNOSIS — B18.2 CHRONIC HEPATITIS C WITHOUT HEPATIC COMA (HCC): ICD-10-CM

## 2020-01-23 LAB
ALBUMIN SERPL-MCNC: 3.9 G/DL (ref 3.5–5.2)
ALBUMIN/GLOB SERPL: 1.2 G/DL
ALP SERPL-CCNC: 100 U/L (ref 39–117)
ALPHA-FETOPROTEIN: 4.45 NG/ML (ref 0–8.3)
ALT SERPL W P-5'-P-CCNC: 17 U/L (ref 1–41)
ANION GAP SERPL CALCULATED.3IONS-SCNC: 11.7 MMOL/L (ref 5–15)
AST SERPL-CCNC: 25 U/L (ref 1–40)
BILIRUB SERPL-MCNC: 0.7 MG/DL (ref 0.2–1.2)
BUN BLD-MCNC: 8 MG/DL (ref 6–20)
BUN/CREAT SERPL: 10.7 (ref 7–25)
CALCIUM SPEC-SCNC: 9.2 MG/DL (ref 8.6–10.5)
CHLORIDE SERPL-SCNC: 99 MMOL/L (ref 98–107)
CO2 SERPL-SCNC: 26.3 MMOL/L (ref 22–29)
CREAT BLD-MCNC: 0.75 MG/DL (ref 0.76–1.27)
GFR SERPL CREATININE-BSD FRML MDRD: 109 ML/MIN/1.73
GLOBULIN UR ELPH-MCNC: 3.2 GM/DL
GLUCOSE BLD-MCNC: 91 MG/DL (ref 65–99)
POTASSIUM BLD-SCNC: 3.5 MMOL/L (ref 3.5–5.2)
PROT SERPL-MCNC: 7.1 G/DL (ref 6–8.5)
SODIUM BLD-SCNC: 137 MMOL/L (ref 136–145)

## 2020-01-23 PROCEDURE — 36415 COLL VENOUS BLD VENIPUNCTURE: CPT

## 2020-01-23 PROCEDURE — 82105 ALPHA-FETOPROTEIN SERUM: CPT

## 2020-01-23 PROCEDURE — 87522 HEPATITIS C REVRS TRNSCRPJ: CPT

## 2020-01-23 PROCEDURE — 80053 COMPREHEN METABOLIC PANEL: CPT

## 2020-01-25 LAB
HCV RNA SERPL NAA+PROBE-ACNC: NORMAL IU/ML
TEST INFORMATION: NORMAL

## 2020-01-27 ENCOUNTER — TELEPHONE (OUTPATIENT)
Dept: GASTROENTEROLOGY | Facility: CLINIC | Age: 55
End: 2020-01-27

## 2020-02-03 NOTE — TELEPHONE ENCOUNTER
I have left messages for patient to return my call.  I am yet to hear from him.  I am going to try his sister's number and if I can not reach him through her, I will send out a letter asking him to contact me.

## 2020-05-19 ENCOUNTER — OFFICE VISIT (OUTPATIENT)
Dept: FAMILY MEDICINE CLINIC | Facility: CLINIC | Age: 55
End: 2020-05-19

## 2020-05-19 VITALS
BODY MASS INDEX: 27.52 KG/M2 | WEIGHT: 192.2 LBS | HEART RATE: 70 BPM | OXYGEN SATURATION: 96 % | TEMPERATURE: 99.6 F | HEIGHT: 70 IN | DIASTOLIC BLOOD PRESSURE: 80 MMHG | SYSTOLIC BLOOD PRESSURE: 110 MMHG

## 2020-05-19 DIAGNOSIS — H60.501 ACUTE OTITIS EXTERNA OF RIGHT EAR, UNSPECIFIED TYPE: Primary | ICD-10-CM

## 2020-05-19 DIAGNOSIS — I10 ESSENTIAL HYPERTENSION: ICD-10-CM

## 2020-05-19 PROCEDURE — 99213 OFFICE O/P EST LOW 20 MIN: CPT | Performed by: NURSE PRACTITIONER

## 2020-05-19 RX ORDER — LISINOPRIL AND HYDROCHLOROTHIAZIDE 20; 12.5 MG/1; MG/1
1 TABLET ORAL DAILY
Status: ON HOLD | COMMUNITY
Start: 2020-04-29 | End: 2020-06-08 | Stop reason: SDUPTHER

## 2020-05-19 NOTE — PROGRESS NOTES
"Subjective   Brandon Dillon is a 54 y.o. male.   Chief Compliant: The patient presents with Establish Care and Earache    History of Present Illness Presents today as a new patient with ear pain that onset over a week ago. Painful with foul odor drainage. No fever chills or cough.      Patient has a HX HTN on lisinopril/HCTZ controlled today.  HX Hep C in which he states he completed treatment.  He is currently without a provider he is unsure if he is transferring care to this clinic as he doesn't know where he is going to live.  He is currently living with his sister as he is  from his wife. Previous care in [facility].      The following portions of the patient's history were reviewed and updated as appropriate: allergies, current medications, past family history, past medical history, past social history, past surgical history and problem list.      Review of Systems   Constitutional: Negative for activity change, chills and fever.   HENT: Positive for ear discharge and ear pain. Negative for congestion.    Respiratory: Negative.    Cardiovascular: Negative.    Skin: Negative.    All other systems reviewed and are negative.      Procedures    Vitals: Blood pressure 110/80, pulse 70, temperature 99.6 °F (37.6 °C), height 177.8 cm (70\"), weight 87.2 kg (192 lb 3.2 oz), SpO2 96 %.     Allergies:   Allergies   Allergen Reactions   • Sulfa Antibiotics Hives, Shortness Of Breath and Swelling   • Topamax [Topiramate] Dermatitis          Objective   Physical Exam   Constitutional: He is oriented to person, place, and time. He appears well-developed and well-nourished. No distress.   HENT:   Head: Normocephalic.   Right Ear: There is swelling. Decreased hearing is noted.   Left Ear: Hearing, tympanic membrane, external ear and ear canal normal.   Cardiovascular: Normal rate, regular rhythm and normal heart sounds.   No murmur heard.  Pulmonary/Chest: Effort normal. He has wheezes (course exp wheeze). "   Neurological: He is alert and oriented to person, place, and time.   Skin: Skin is warm and dry. He is not diaphoretic.   Psychiatric: He has a normal mood and affect. His behavior is normal.   Nursing note and vitals reviewed.      During this visit the following were done:  Labs Reviewed []    Labs Ordered []    Radiology Reports Reviewed []    Radiology Ordered []    PCP Records Reviewed []    Referring Provider Records Reviewed []    ER Records Reviewed []    Hospital Records Reviewed []    History Obtained From Family []    Radiology Images Reviewed []    Other Reviewed []    Records Requested []      Assessment/Plan   Discussed with patient impression and plan, patient verbalizes understanding  Brandon was seen today for establish care and earache.    Diagnoses and all orders for this visit:    Acute otitis externa of right ear, unspecified type  -     ciprofloxacin-hydrocortisone (CIPRO HC OTIC) 0.2-1 % otic suspension; Administer 3 drops to the right ear 2 (Two) Times a Day.  To RTC if symptoms persist or worsen in any way     Essential hypertension  If he decides to continue care here  Discussed he needs follow up fasting labs        Brandon Dillon  reports that he quit smoking about 8 years ago. He has a 1.00 pack-year smoking history. His smokeless tobacco use includes snuff.. I have educated him on the risk of diseases from using tobacco products such as cancer, COPD and heart diease.     I advised him to quit and he is not willing to quit.    I spent 3  minutes counseling the patient.

## 2020-05-20 ENCOUNTER — HOSPITAL ENCOUNTER (EMERGENCY)
Facility: HOSPITAL | Age: 55
Discharge: HOME | End: 2020-05-20
Attending: EMERGENCY MEDICINE | Admitting: EMERGENCY MEDICINE

## 2020-05-20 ENCOUNTER — APPOINTMENT (OUTPATIENT)
Dept: GENERAL RADIOLOGY | Facility: HOSPITAL | Age: 55
End: 2020-05-20

## 2020-05-20 VITALS
WEIGHT: 190 LBS | HEART RATE: 72 BPM | BODY MASS INDEX: 27.2 KG/M2 | RESPIRATION RATE: 20 BRPM | HEIGHT: 70 IN | DIASTOLIC BLOOD PRESSURE: 72 MMHG | SYSTOLIC BLOOD PRESSURE: 122 MMHG | OXYGEN SATURATION: 98 % | TEMPERATURE: 98.5 F

## 2020-05-20 DIAGNOSIS — H60.333 ACUTE SWIMMER'S EAR OF BOTH SIDES: ICD-10-CM

## 2020-05-20 DIAGNOSIS — J20.9 BRONCHITIS WITH BRONCHOSPASM: Primary | ICD-10-CM

## 2020-05-20 DIAGNOSIS — H60.501 ACUTE OTITIS EXTERNA OF RIGHT EAR, UNSPECIFIED TYPE: ICD-10-CM

## 2020-05-20 PROCEDURE — 71046 X-RAY EXAM CHEST 2 VIEWS: CPT | Performed by: RADIOLOGY

## 2020-05-20 PROCEDURE — 71046 X-RAY EXAM CHEST 2 VIEWS: CPT

## 2020-05-20 PROCEDURE — 99283 EMERGENCY DEPT VISIT LOW MDM: CPT

## 2020-05-20 PROCEDURE — 94640 AIRWAY INHALATION TREATMENT: CPT

## 2020-05-20 PROCEDURE — 63710000001 PREDNISONE PER 1 MG: Performed by: EMERGENCY MEDICINE

## 2020-05-20 PROCEDURE — 94799 UNLISTED PULMONARY SVC/PX: CPT

## 2020-05-20 RX ORDER — PREDNISONE 20 MG/1
40 TABLET ORAL ONCE
Status: COMPLETED | OUTPATIENT
Start: 2020-05-20 | End: 2020-05-20

## 2020-05-20 RX ORDER — METHYLPREDNISOLONE 4 MG/1
TABLET ORAL
Qty: 1 EACH | Refills: 0 | Status: ON HOLD | OUTPATIENT
Start: 2020-05-20 | End: 2020-06-03

## 2020-05-20 RX ORDER — CIPROFLOXACIN 0.5 MG/.25ML
0.25 SOLUTION/ DROPS AURICULAR (OTIC) 2 TIMES DAILY
Status: DISCONTINUED | OUTPATIENT
Start: 2020-05-20 | End: 2020-05-20 | Stop reason: HOSPADM

## 2020-05-20 RX ORDER — IPRATROPIUM BROMIDE AND ALBUTEROL SULFATE 2.5; .5 MG/3ML; MG/3ML
3 SOLUTION RESPIRATORY (INHALATION) ONCE
Status: COMPLETED | OUTPATIENT
Start: 2020-05-20 | End: 2020-05-20

## 2020-05-20 RX ORDER — GUAIFENESIN AND CODEINE PHOSPHATE 100; 10 MG/5ML; MG/5ML
5 SOLUTION ORAL 4 TIMES DAILY PRN
Qty: 120 ML | Refills: 0 | Status: ON HOLD | OUTPATIENT
Start: 2020-05-20 | End: 2020-06-03

## 2020-05-20 RX ORDER — DOXYCYCLINE 100 MG/1
100 CAPSULE ORAL 2 TIMES DAILY
Qty: 20 CAPSULE | Refills: 0 | Status: ON HOLD | OUTPATIENT
Start: 2020-05-20 | End: 2020-06-03

## 2020-05-20 RX ORDER — ALBUTEROL SULFATE 90 UG/1
2 AEROSOL, METERED RESPIRATORY (INHALATION) EVERY 6 HOURS PRN
Qty: 1 INHALER | Refills: 0 | Status: ON HOLD | OUTPATIENT
Start: 2020-05-20 | End: 2020-06-08 | Stop reason: SDUPTHER

## 2020-05-20 RX ORDER — DOXYCYCLINE 100 MG/1
100 CAPSULE ORAL ONCE
Status: COMPLETED | OUTPATIENT
Start: 2020-05-20 | End: 2020-05-20

## 2020-05-20 RX ADMIN — DOXYCYCLINE 100 MG: 100 CAPSULE ORAL at 13:39

## 2020-05-20 RX ADMIN — PREDNISONE 40 MG: 20 TABLET ORAL at 13:38

## 2020-05-20 RX ADMIN — CIPROFLOXACIN 0.5 MG: 0.5 SOLUTION/ DROPS AURICULAR (OTIC) at 14:03

## 2020-05-20 RX ADMIN — IPRATROPIUM BROMIDE AND ALBUTEROL SULFATE 3 ML: .5; 3 SOLUTION RESPIRATORY (INHALATION) at 13:42

## 2020-05-20 NOTE — ED PROVIDER NOTES
"Subjective   55-year-old white male complains of right ear pain.  Patient describes 2-week history of bilateral ear pain which is worsening.  He complains of swelling with some discharge, right greater than left.  He is not taking any medicine for this.  He has had 1 previous similar episode in the past.  He also complains of wheezing and mild shortness of breath for the past 2 to 3 weeks.  He stopped smoking approximately 6 months ago and now uses smokeless tobacco.  Denies any previous diagnosis of chronic bronchitis, emphysema, COPD, asthma or other pulmonary diseases.          Review of Systems   All other systems reviewed and are negative.      Past Medical History:   Diagnosis Date   • Acid reflux    • Anxiety    • Asthma     as child   • Cirrhosis (CMS/[facility])    • Depression    • History of substance abuse (CMS/[facility])     Clean since May 31st 2012.    • Hypertension    • Migraines    • Suicide attempt (CMS/[facility])     \"Years ago, I took a bunch of pills.\"       Allergies   Allergen Reactions   • Sulfa Antibiotics Hives, Shortness Of Breath and Swelling   • Neosporin [Bacitracin-Polymyxin B] Hives   • Topamax [Topiramate] Dermatitis       Past Surgical History:   Procedure Laterality Date   • FACIAL FRACTURE SURGERY  2017       Family History   Problem Relation Age of Onset   • Dementia Mother    • Anxiety disorder Mother    • Heart disease Father    • Anxiety disorder Maternal Grandfather    • Depression Maternal Grandfather        Social History     Socioeconomic History   • Marital status:      Spouse name: Not on file   • Number of children: Not on file   • Years of education: Not on file   • Highest education level: Not on file   Tobacco Use   • Smoking status: Former Smoker     Packs/day: 0.50     Years: 2.00     Pack years: 1.00     Last attempt to quit:      Years since quittin.3   • Smokeless tobacco: Current User     Types: Snuff   • Tobacco comment: 1 can per day   Substance and Sexual " Activity   • Alcohol use: No     Frequency: Never   • Drug use: No   • Sexual activity: Yes     Comment: unemployed,  has 2 sons/2 daughters           Objective   Physical Exam   Constitutional: He is oriented to person, place, and time. He appears well-developed and well-nourished.   HENT:   Head: Normocephalic and atraumatic.   Right Ear: Tympanic membrane normal. There is swelling and tenderness. No drainage.   Left Ear: Tympanic membrane normal. There is swelling and tenderness. No drainage.   Edema of bilateral EACs with mild to moderate erythema, right greater than left.  No obvious discharge at this time.   Eyes: No scleral icterus.   Cardiovascular: Normal rate, regular rhythm and normal heart sounds. Exam reveals no gallop and no friction rub.   No murmur heard.  Pulmonary/Chest: Effort normal. No respiratory distress. He has wheezes (Throughout). He has no rhonchi. He has no rales.   Abdominal: Soft. He exhibits no distension. There is no tenderness.   Musculoskeletal: Normal range of motion. He exhibits no edema.   Neurological: He is alert and oriented to person, place, and time.   Skin: Skin is warm and dry.   Psychiatric: He has a normal mood and affect.   Nursing note and vitals reviewed.      Procedures  Results for orders placed or performed in visit on 01/23/20   Comprehensive Metabolic Panel   Result Value Ref Range    Glucose 91 65 - 99 mg/dL    BUN 8 6 - 20 mg/dL    Creatinine 0.75 (L) 0.76 - 1.27 mg/dL    Sodium 137 136 - 145 mmol/L    Potassium 3.5 3.5 - 5.2 mmol/L    Chloride 99 98 - 107 mmol/L    CO2 26.3 22.0 - 29.0 mmol/L    Calcium 9.2 8.6 - 10.5 mg/dL    Total Protein 7.1 6.0 - 8.5 g/dL    Albumin 3.90 3.50 - 5.20 g/dL    ALT (SGPT) 17 1 - 41 U/L    AST (SGOT) 25 1 - 40 U/L    Alkaline Phosphatase 100 39 - 117 U/L    Total Bilirubin 0.7 0.2 - 1.2 mg/dL    eGFR Non African Amer 109 >60 mL/min/1.73    Globulin 3.2 gm/dL    A/G Ratio 1.2 g/dL    BUN/Creatinine Ratio 10.7 7.0 - 25.0     Anion Gap 11.7 5.0 - 15.0 mmol/L   Hepatitis C RNA, Quantitative, PCR (graph)   Result Value Ref Range    Hepatitis C Quantitation HCV Not Detected IU/mL    Test Information Comment    AFP Tumor Marker   Result Value Ref Range    ALPHA-FETOPROTEIN 4.45 0 - 8.3 ng/mL     Xr Chest 2 View    Result Date: 5/20/2020  Narrative: EXAMINATION: XR CHEST 2 VW-  CLINICAL INDICATION:     Wheezing  TECHNIQUE:  XR CHEST 2 VW-  COMPARISON: 12/18/2019  FINDINGS:  Lungs are aerated. Heart size, mediastinum, and pulmonary vascularity are unremarkable. No pneumothorax. No effusions. No acute osseous findings.       Impression: No radiographic evidence of acute cardiac or pulmonary disease.  This report was finalized on 5/20/2020 1:59 PM by Dr. Ildefonso Aguirre MD.               ED Course                                           MDM  Number of Diagnoses or Management Options  Acute swimmer's ear of both sides:   Bronchitis with bronchospasm:      Amount and/or Complexity of Data Reviewed  Tests in the radiology section of CPT®: reviewed    Risk of Complications, Morbidity, and/or Mortality  Presenting problems: moderate  Diagnostic procedures: moderate  Management options: moderate        Final diagnoses:   Bronchitis with bronchospasm   Acute swimmer's ear of both sides            James Lozada MD  05/20/20 [id number]

## 2020-06-03 ENCOUNTER — HOSPITAL ENCOUNTER (EMERGENCY)
Facility: HOSPITAL | Age: 55
Discharge: STILL ADMITTED OR ADMINISTRATIVE | End: 2020-06-03
Attending: EMERGENCY MEDICINE

## 2020-06-03 ENCOUNTER — HOSPITAL ENCOUNTER (INPATIENT)
Facility: HOSPITAL | Age: 55
LOS: 5 days | Discharge: NURSING, REHAB OR LONG-TERM CARE | End: 2020-06-08
Attending: PSYCHIATRY & NEUROLOGY | Admitting: PSYCHIATRY & NEUROLOGY

## 2020-06-03 VITALS
RESPIRATION RATE: 20 BRPM | SYSTOLIC BLOOD PRESSURE: 108 MMHG | OXYGEN SATURATION: 96 % | HEART RATE: 54 BPM | WEIGHT: 189 LBS | BODY MASS INDEX: 27.06 KG/M2 | TEMPERATURE: 98.2 F | DIASTOLIC BLOOD PRESSURE: 82 MMHG | HEIGHT: 70 IN

## 2020-06-03 DIAGNOSIS — I10 ESSENTIAL HYPERTENSION: ICD-10-CM

## 2020-06-03 DIAGNOSIS — F19.10 SUBSTANCE ABUSE (HCC): Primary | ICD-10-CM

## 2020-06-03 PROBLEM — F32.9 MDD (MAJOR DEPRESSIVE DISORDER): Status: ACTIVE | Noted: 2020-06-03

## 2020-06-03 LAB
6-ACETYL MORPHINE: NEGATIVE
ALBUMIN SERPL-MCNC: 4.35 G/DL (ref 3.5–5.2)
ALBUMIN/GLOB SERPL: 1.3 G/DL
ALP SERPL-CCNC: 74 U/L (ref 39–117)
ALT SERPL W P-5'-P-CCNC: 22 U/L (ref 1–41)
AMPHET+METHAMPHET UR QL: POSITIVE
ANION GAP SERPL CALCULATED.3IONS-SCNC: 9.3 MMOL/L (ref 5–15)
AST SERPL-CCNC: 30 U/L (ref 1–40)
BARBITURATES UR QL SCN: NEGATIVE
BASOPHILS # BLD AUTO: 0.05 10*3/MM3 (ref 0–0.2)
BASOPHILS NFR BLD AUTO: 0.6 % (ref 0–1.5)
BENZODIAZ UR QL SCN: NEGATIVE
BILIRUB SERPL-MCNC: 0.5 MG/DL (ref 0.2–1.2)
BILIRUB UR QL STRIP: NEGATIVE
BUN BLD-MCNC: 5 MG/DL (ref 6–20)
BUN/CREAT SERPL: 6.6 (ref 7–25)
BUPRENORPHINE SERPL-MCNC: POSITIVE NG/ML
CALCIUM SPEC-SCNC: 9.7 MG/DL (ref 8.6–10.5)
CANNABINOIDS SERPL QL: NEGATIVE
CHLORIDE SERPL-SCNC: 103 MMOL/L (ref 98–107)
CLARITY UR: CLEAR
CO2 SERPL-SCNC: 26.7 MMOL/L (ref 22–29)
COCAINE UR QL: NEGATIVE
COLOR UR: YELLOW
CREAT BLD-MCNC: 0.76 MG/DL (ref 0.76–1.27)
DEPRECATED RDW RBC AUTO: 45.4 FL (ref 37–54)
EOSINOPHIL # BLD AUTO: 0.43 10*3/MM3 (ref 0–0.4)
EOSINOPHIL NFR BLD AUTO: 4.9 % (ref 0.3–6.2)
ERYTHROCYTE [DISTWIDTH] IN BLOOD BY AUTOMATED COUNT: 13.2 % (ref 12.3–15.4)
ETHANOL BLD-MCNC: <10 MG/DL (ref 0–10)
ETHANOL UR QL: <0.01 %
GFR SERPL CREATININE-BSD FRML MDRD: 108 ML/MIN/1.73
GLOBULIN UR ELPH-MCNC: 3.4 GM/DL
GLUCOSE BLD-MCNC: 102 MG/DL (ref 65–99)
GLUCOSE UR STRIP-MCNC: NEGATIVE MG/DL
HCT VFR BLD AUTO: 45.8 % (ref 37.5–51)
HGB BLD-MCNC: 15.3 G/DL (ref 13–17.7)
HGB UR QL STRIP.AUTO: NEGATIVE
IMM GRANULOCYTES # BLD AUTO: 0.02 10*3/MM3 (ref 0–0.05)
IMM GRANULOCYTES NFR BLD AUTO: 0.2 % (ref 0–0.5)
KETONES UR QL STRIP: NEGATIVE
LEUKOCYTE ESTERASE UR QL STRIP.AUTO: NEGATIVE
LYMPHOCYTES # BLD AUTO: 2.72 10*3/MM3 (ref 0.7–3.1)
LYMPHOCYTES NFR BLD AUTO: 31 % (ref 19.6–45.3)
MAGNESIUM SERPL-MCNC: 2.1 MG/DL (ref 1.6–2.6)
MCH RBC QN AUTO: 31.3 PG (ref 26.6–33)
MCHC RBC AUTO-ENTMCNC: 33.4 G/DL (ref 31.5–35.7)
MCV RBC AUTO: 93.7 FL (ref 79–97)
METHADONE UR QL SCN: NEGATIVE
MONOCYTES # BLD AUTO: 0.73 10*3/MM3 (ref 0.1–0.9)
MONOCYTES NFR BLD AUTO: 8.3 % (ref 5–12)
NEUTROPHILS # BLD AUTO: 4.82 10*3/MM3 (ref 1.7–7)
NEUTROPHILS NFR BLD AUTO: 55 % (ref 42.7–76)
NITRITE UR QL STRIP: NEGATIVE
NRBC BLD AUTO-RTO: 0 /100 WBC (ref 0–0.2)
OPIATES UR QL: NEGATIVE
OXYCODONE UR QL SCN: NEGATIVE
PCP UR QL SCN: NEGATIVE
PH UR STRIP.AUTO: 6 [PH] (ref 5–8)
PLATELET # BLD AUTO: 207 10*3/MM3 (ref 140–450)
PMV BLD AUTO: 11 FL (ref 6–12)
POTASSIUM BLD-SCNC: 3.7 MMOL/L (ref 3.5–5.2)
PROT SERPL-MCNC: 7.7 G/DL (ref 6–8.5)
PROT UR QL STRIP: NEGATIVE
RBC # BLD AUTO: 4.89 10*6/MM3 (ref 4.14–5.8)
SODIUM BLD-SCNC: 139 MMOL/L (ref 136–145)
SP GR UR STRIP: 1.02 (ref 1–1.03)
UROBILINOGEN UR QL STRIP: ABNORMAL
WBC NRBC COR # BLD: 8.77 10*3/MM3 (ref 3.4–10.8)

## 2020-06-03 PROCEDURE — 80307 DRUG TEST PRSMV CHEM ANLYZR: CPT | Performed by: PHYSICIAN ASSISTANT

## 2020-06-03 PROCEDURE — 85025 COMPLETE CBC W/AUTO DIFF WBC: CPT | Performed by: PHYSICIAN ASSISTANT

## 2020-06-03 PROCEDURE — 80053 COMPREHEN METABOLIC PANEL: CPT | Performed by: PHYSICIAN ASSISTANT

## 2020-06-03 PROCEDURE — 81003 URINALYSIS AUTO W/O SCOPE: CPT | Performed by: PHYSICIAN ASSISTANT

## 2020-06-03 PROCEDURE — 83735 ASSAY OF MAGNESIUM: CPT | Performed by: PHYSICIAN ASSISTANT

## 2020-06-03 PROCEDURE — 93005 ELECTROCARDIOGRAM TRACING: CPT | Performed by: PSYCHIATRY & NEUROLOGY

## 2020-06-03 PROCEDURE — 93010 ELECTROCARDIOGRAM REPORT: CPT | Performed by: SPECIALIST

## 2020-06-03 RX ORDER — CLONAZEPAM 0.5 MG/1
0.5 TABLET ORAL DAILY PRN
Status: DISCONTINUED | OUTPATIENT
Start: 2020-06-03 | End: 2020-06-08 | Stop reason: HOSPADM

## 2020-06-03 RX ORDER — CLONIDINE HYDROCHLORIDE 0.1 MG/1
0.1 TABLET ORAL 4 TIMES DAILY PRN
Status: ACTIVE | OUTPATIENT
Start: 2020-06-03 | End: 2020-06-04

## 2020-06-03 RX ORDER — CLONIDINE HYDROCHLORIDE 0.1 MG/1
0.1 TABLET ORAL 4 TIMES DAILY PRN
Status: ACTIVE | OUTPATIENT
Start: 2020-06-04 | End: 2020-06-05

## 2020-06-03 RX ORDER — CYCLOBENZAPRINE HCL 10 MG
10 TABLET ORAL 3 TIMES DAILY PRN
Status: DISCONTINUED | OUTPATIENT
Start: 2020-06-03 | End: 2020-06-08 | Stop reason: HOSPADM

## 2020-06-03 RX ORDER — TRAMADOL HYDROCHLORIDE 50 MG/1
50 TABLET ORAL EVERY 6 HOURS PRN
COMMUNITY
End: 2020-06-08 | Stop reason: HOSPADM

## 2020-06-03 RX ORDER — DICYCLOMINE HYDROCHLORIDE 10 MG/1
10 CAPSULE ORAL 3 TIMES DAILY PRN
Status: DISCONTINUED | OUTPATIENT
Start: 2020-06-03 | End: 2020-06-08 | Stop reason: HOSPADM

## 2020-06-03 RX ORDER — CLONIDINE HYDROCHLORIDE 0.1 MG/1
0.1 TABLET ORAL 3 TIMES DAILY PRN
Status: ACTIVE | OUTPATIENT
Start: 2020-06-05 | End: 2020-06-06

## 2020-06-03 RX ORDER — PROPRANOLOL HYDROCHLORIDE 20 MG/1
80 TABLET ORAL 2 TIMES DAILY
Status: DISCONTINUED | OUTPATIENT
Start: 2020-06-03 | End: 2020-06-08 | Stop reason: HOSPADM

## 2020-06-03 RX ORDER — FAMOTIDINE 20 MG/1
20 TABLET, FILM COATED ORAL 2 TIMES DAILY PRN
Status: DISCONTINUED | OUTPATIENT
Start: 2020-06-03 | End: 2020-06-08 | Stop reason: HOSPADM

## 2020-06-03 RX ORDER — CLONIDINE HYDROCHLORIDE 0.1 MG/1
0.1 TABLET ORAL 2 TIMES DAILY PRN
Status: ACTIVE | OUTPATIENT
Start: 2020-06-06 | End: 2020-06-07

## 2020-06-03 RX ORDER — LOPERAMIDE HYDROCHLORIDE 2 MG/1
2 CAPSULE ORAL
Status: DISCONTINUED | OUTPATIENT
Start: 2020-06-03 | End: 2020-06-08 | Stop reason: HOSPADM

## 2020-06-03 RX ORDER — HYDROXYZINE 50 MG/1
50 TABLET, FILM COATED ORAL EVERY 6 HOURS PRN
Status: DISCONTINUED | OUTPATIENT
Start: 2020-06-03 | End: 2020-06-04

## 2020-06-03 RX ORDER — TRAZODONE HYDROCHLORIDE 50 MG/1
50 TABLET ORAL NIGHTLY PRN
Status: DISCONTINUED | OUTPATIENT
Start: 2020-06-03 | End: 2020-06-04

## 2020-06-03 RX ORDER — CLONAZEPAM 0.5 MG/1
0.5 TABLET ORAL DAILY PRN
COMMUNITY
End: 2020-06-08 | Stop reason: HOSPADM

## 2020-06-03 RX ORDER — TRAMADOL HYDROCHLORIDE 50 MG/1
50 TABLET ORAL EVERY 6 HOURS PRN
Status: DISCONTINUED | OUTPATIENT
Start: 2020-06-03 | End: 2020-06-04

## 2020-06-03 RX ORDER — CLONIDINE HYDROCHLORIDE 0.1 MG/1
0.1 TABLET ORAL DAILY PRN
Status: ACTIVE | OUTPATIENT
Start: 2020-06-07 | End: 2020-06-08

## 2020-06-03 RX ORDER — IBUPROFEN 400 MG/1
400 TABLET ORAL EVERY 6 HOURS PRN
Status: DISCONTINUED | OUTPATIENT
Start: 2020-06-03 | End: 2020-06-08 | Stop reason: HOSPADM

## 2020-06-03 RX ORDER — ALBUTEROL SULFATE 90 UG/1
2 AEROSOL, METERED RESPIRATORY (INHALATION) EVERY 6 HOURS PRN
Status: DISCONTINUED | OUTPATIENT
Start: 2020-06-03 | End: 2020-06-08 | Stop reason: HOSPADM

## 2020-06-03 RX ORDER — BENZONATATE 100 MG/1
100 CAPSULE ORAL 3 TIMES DAILY PRN
Status: DISCONTINUED | OUTPATIENT
Start: 2020-06-03 | End: 2020-06-08 | Stop reason: HOSPADM

## 2020-06-03 RX ORDER — PANTOPRAZOLE SODIUM 40 MG/1
40 TABLET, DELAYED RELEASE ORAL DAILY
Status: DISCONTINUED | OUTPATIENT
Start: 2020-06-04 | End: 2020-06-08 | Stop reason: HOSPADM

## 2020-06-03 RX ORDER — NICOTINE 21 MG/24HR
1 PATCH, TRANSDERMAL 24 HOURS TRANSDERMAL
Status: DISCONTINUED | OUTPATIENT
Start: 2020-06-04 | End: 2020-06-08 | Stop reason: HOSPADM

## 2020-06-03 RX ORDER — ALUMINA, MAGNESIA, AND SIMETHICONE 2400; 2400; 240 MG/30ML; MG/30ML; MG/30ML
15 SUSPENSION ORAL EVERY 6 HOURS PRN
Status: DISCONTINUED | OUTPATIENT
Start: 2020-06-03 | End: 2020-06-08 | Stop reason: HOSPADM

## 2020-06-03 RX ORDER — BENZTROPINE MESYLATE 1 MG/ML
1 INJECTION INTRAMUSCULAR; INTRAVENOUS ONCE AS NEEDED
Status: DISCONTINUED | OUTPATIENT
Start: 2020-06-03 | End: 2020-06-04

## 2020-06-03 RX ORDER — BENZTROPINE MESYLATE 1 MG/1
2 TABLET ORAL ONCE AS NEEDED
Status: DISCONTINUED | OUTPATIENT
Start: 2020-06-03 | End: 2020-06-04

## 2020-06-03 RX ORDER — ONDANSETRON 4 MG/1
4 TABLET, FILM COATED ORAL EVERY 6 HOURS PRN
Status: DISCONTINUED | OUTPATIENT
Start: 2020-06-03 | End: 2020-06-08 | Stop reason: HOSPADM

## 2020-06-03 RX ORDER — ECHINACEA PURPUREA EXTRACT 125 MG
2 TABLET ORAL AS NEEDED
Status: DISCONTINUED | OUTPATIENT
Start: 2020-06-03 | End: 2020-06-08 | Stop reason: HOSPADM

## 2020-06-03 RX ADMIN — PROPRANOLOL HYDROCHLORIDE 80 MG: 20 TABLET ORAL at 21:53

## 2020-06-03 NOTE — NURSING NOTE
"Intake assessment completed. Patient reports that he is here today because he needs help.He states that his sister dropped him off.  He states he has been using a lot of pills, meth and is very depressed. He denies HI or hallucinations, but does state that he has been feeling so bad that he has wished he was dead . He states that at one point last week he took a bunch of pills but they must not have worked because he woke up. He states he has been in this state for weeks. He admit to intake that he has been snorting meth, and lots of pain pills, and nerve pills. Anxiety 8/10 and depression 9*/10. He denies etoh use. Pt vague with SI information and unwilling to give exact details. Pt does state \" I just don't know anymore, I have had thoughts\". Support provided.   "

## 2020-06-03 NOTE — ED PROVIDER NOTES
"Subjective   54-year-old male presents secondary to need for detox.  Patient states he has been taking Klonopin and opiates by mouth and snorting methamphetamine.  He denies any IV drug use.  No fever.  No falls or injury.  No medical complaints.  No exposure to COVID-19.          Review of Systems   Constitutional: Negative.  Negative for fever.   HENT: Negative.    Respiratory: Negative.    Cardiovascular: Negative.  Negative for chest pain.   Gastrointestinal: Negative.  Negative for abdominal pain.   Endocrine: Negative.    Genitourinary: Negative.  Negative for dysuria.   Skin: Negative.    Neurological: Negative.    Psychiatric/Behavioral: Negative.    All other systems reviewed and are negative.      Past Medical History:   Diagnosis Date   • Acid reflux    • Anxiety    • Asthma     as child   • Cirrhosis (CMS/[facility])    • Depression    • History of substance abuse (CMS/[facility])     Clean since May 31st 2012.    • Hypertension    • Migraines    • Suicide attempt (CMS/[facility])     \"Years ago, I took a bunch of pills.\"       Allergies   Allergen Reactions   • Sulfa Antibiotics Hives, Shortness Of Breath and Swelling   • Neosporin [Bacitracin-Polymyxin B] Hives   • Topamax [Topiramate] Dermatitis       Past Surgical History:   Procedure Laterality Date   • FACIAL FRACTURE SURGERY  2017       Family History   Problem Relation Age of Onset   • Dementia Mother    • Anxiety disorder Mother    • Heart disease Father    • Anxiety disorder Maternal Grandfather    • Depression Maternal Grandfather        Social History     Socioeconomic History   • Marital status:      Spouse name: Not on file   • Number of children: Not on file   • Years of education: Not on file   • Highest education level: Not on file   Tobacco Use   • Smoking status: Former Smoker     Packs/day: 0.50     Years: 2.00     Pack years: 1.00     Types: Cigarettes     Last attempt to quit: 2012     Years since quittin.4   • Smokeless tobacco: Current " User     Types: Snuff   • Tobacco comment: 1 can per day   Substance and Sexual Activity   • Alcohol use: No     Frequency: Never     Comment: denies   • Drug use: Yes     Comment: see below   • Sexual activity: Yes     Comment: unemployed,  has 2 sons/2 daughters           Objective   Physical Exam   Constitutional: He is oriented to person, place, and time. He appears well-developed and well-nourished. No distress.   HENT:   Head: Normocephalic and atraumatic.   Right Ear: External ear normal.   Left Ear: External ear normal.   Nose: Nose normal.   Eyes: Pupils are equal, round, and reactive to light. Conjunctivae and EOM are normal.   Neck: Normal range of motion. Neck supple. No JVD present. No tracheal deviation present.   Cardiovascular: Normal rate, regular rhythm and normal heart sounds.   No murmur heard.  Pulmonary/Chest: Effort normal and breath sounds normal. No respiratory distress. He has no wheezes.   Abdominal: Soft. Bowel sounds are normal. There is no tenderness.   Musculoskeletal: Normal range of motion. He exhibits no edema or deformity.   Neurological: He is alert and oriented to person, place, and time. No cranial nerve deficit.   Skin: Skin is warm and dry. No rash noted. He is not diaphoretic. No erythema. No pallor.   Psychiatric: His behavior is normal. Thought content normal. He exhibits a depressed mood.   Nursing note and vitals reviewed.      Procedures           ED Course                                           MDM    Final diagnoses:   Substance abuse (CMS/[facility])            Marcelle Rivera PA  06/03/20 0598

## 2020-06-04 PROBLEM — F19.11 HISTORY OF SUBSTANCE ABUSE (HCC): Status: ACTIVE | Noted: 2020-06-04

## 2020-06-04 PROBLEM — F33.8 OTHER RECURRENT DEPRESSIVE DISORDERS (HCC): Status: ACTIVE | Noted: 2020-06-04

## 2020-06-04 PROCEDURE — 99223 1ST HOSP IP/OBS HIGH 75: CPT | Performed by: PSYCHIATRY & NEUROLOGY

## 2020-06-04 RX ORDER — MIRTAZAPINE 15 MG/1
15 TABLET, FILM COATED ORAL NIGHTLY
Status: DISCONTINUED | OUTPATIENT
Start: 2020-06-04 | End: 2020-06-08 | Stop reason: HOSPADM

## 2020-06-04 RX ORDER — LORAZEPAM 1 MG/1
1 TABLET ORAL 3 TIMES DAILY
Status: COMPLETED | OUTPATIENT
Start: 2020-06-04 | End: 2020-06-05

## 2020-06-04 RX ADMIN — LISINOPRIL: 10 TABLET ORAL at 08:29

## 2020-06-04 RX ADMIN — CLONAZEPAM 0.5 MG: 0.5 TABLET ORAL at 08:29

## 2020-06-04 RX ADMIN — HYDROXYZINE HYDROCHLORIDE 50 MG: 50 TABLET ORAL at 08:29

## 2020-06-04 RX ADMIN — LORAZEPAM 1 MG: 1 TABLET ORAL at 11:56

## 2020-06-04 RX ADMIN — LORAZEPAM 1 MG: 1 TABLET ORAL at 15:43

## 2020-06-04 RX ADMIN — PROPRANOLOL HYDROCHLORIDE 80 MG: 20 TABLET ORAL at 20:27

## 2020-06-04 RX ADMIN — PANTOPRAZOLE SODIUM 40 MG: 40 TABLET, DELAYED RELEASE ORAL at 08:29

## 2020-06-04 RX ADMIN — MIRTAZAPINE 15 MG: 15 TABLET, FILM COATED ORAL at 20:27

## 2020-06-04 RX ADMIN — LORAZEPAM 1 MG: 1 TABLET ORAL at 20:27

## 2020-06-04 RX ADMIN — NICOTINE 1 PATCH: 21 PATCH, EXTENDED RELEASE TRANSDERMAL at 08:29

## 2020-06-04 NOTE — PLAN OF CARE
Problem: Patient Care Overview  Goal: Plan of Care Review  Outcome: Ongoing (interventions implemented as appropriate)  Flowsheets  Taken 6/3/2020 2133  Progress: no change  Outcome Summary: New admit  Taken 6/3/2020 2105  Plan of Care Reviewed With: patient  Patient Agreement with Plan of Care: agrees  Note:   New admission assessment complete. Patient is cooperative and pleasant. He rates anxiety 9/10, depression 10/10, reports poor sleep and poor appetite at this time, declines food offered, he repors he is having some stomach cramps and tactile disturbances due to his withdrawal, he rates his craving of benzos and pain pills at 10/10, he currently denies SI/HI/AVH, does report feeling hopeless, will continue to monitor.

## 2020-06-04 NOTE — PLAN OF CARE
Problem: Patient Care Overview  Goal: Plan of Care Review  Flowsheets (Taken 6/4/2020 1215)  Consent Given to Review Plan with: Considering rehab.  Progress: no change  Plan of Care Reviewed With: patient  Patient Agreement with Plan of Care: agrees  Outcome Summary: Completed initial assessment, discussed alternative aftercare resources and expectations of treatment; reviewed treatment plan.     Problem: Patient Care Overview  Goal: Individualization and Mutuality  Flowsheets (Taken 6/4/2020 1215)  Patient Personal Strengths: expressive of emotions; expressive of needs; motivated for treatment; resilient  Patient Vulnerabilities: Ineffective coping skills, substance abuse.  Patient Specific Goals (Include Timeframe): Patient to identify 3 healthy coping skills, complete crisis safety planning, and deny all SI, HI, and AVH.  Patient Specific Interventions: Patient to access psychiatric evaluation, medication managment, individual and group CBT therapy sessions during admission.  What Information Would Help Us Give You More Personalized Care?: None  What Anxieties, Fears, Concerns, or Questions Do You Have About Your Care?: None  Patient Specific Preferences: Mood stabilization.     Problem: Patient Care Overview  Goal: Discharge Needs Assessment  Flowsheets (Taken 6/4/2020 1215)  Outpatient/Agency/Support Group Needs: residential services  Discharge Coordination/Progress: Patient anticipated to attend rehab upon discharge.  He has [employer] insurance.  Transportation Anticipated: public transportation  Anticipated Discharge Disposition: inpatient rehab facility  Transportation Concerns: car, none  Current Discharge Risk: substance use/abuse; psychiatric illness; lack of support system/caregiver  Concerns to be Addressed: coping/stress; decision making; discharge planning; mental health; substance/tobacco abuse/use; suicidal  Readmission Within the Last 30 Days: no previous admission in last 30 days  Patient/Family  "Anticipated Services at Transition: rehabilitation services  Patient's Choice of Community Agency(s): Patient conisdering rehab referral.  Patient/Family Anticipates Transition to: inpatient rehabilitation facility     Problem: Patient Care Overview  Goal: Interprofessional Rounds/Family Conf  Flowsheets (Taken 6/4/2020 1215)  Participants: social work; nursing; milieu/psych techs; psychiatrist  Summary: Therapist to staff patient's case with treatment team during admission.     Problem: Overarching Goals (Adult)  Goal: Optimized Coping Skills in Response to Life Stressors  Intervention: Promote Effective Coping Strategies  Flowsheets (Taken 6/4/2020 1215)  Supportive Measures: active listening utilized; counseling provided; problem solving facilitated; verbalization of feelings encouraged; self-reflection promoted; self-care encouraged     Problem: Overarching Goals (Adult)  Goal: Develops/Participates in Therapeutic [place] to Support Successful Transition  Intervention: Foster Therapeutic [place]  Flowsheets (Taken 6/4/2020 1215)  Trust Relationship/Rapport: care explained; thoughts/feelings acknowledged; choices provided; emotional support provided; empathic listening provided; questions answered; questions encouraged; reassurance provided     Problem: Overarching Goals (Adult)  Goal: Develops/Participates in Therapeutic [place] to Support Successful Transition  Intervention: Mutually Develop Transition Plan  Flowsheets (Taken 6/4/2020 1215)  Transition Support: community resources reviewed; crisis management plan promoted; follow-up care discussed       DATA:         Therapist met individually with patient this date to introduce role and to discuss hospitalization expectations. Patient agreeable.     New admission assessment complete. Patient reports he's \"just not happy anymore\" and needs \"to get off all the drugs\". He reports using Norco and meth on occasion, he is prescribed Klonopin and hasn't gotten his " "refill filled for 3 days. He states that he sometimes has suicidal thoughts to overdose, patient has had previous attempts but reports \"I always wake up\". He is , but lives alone. He complains of slight nausea, stomach cramps and tactile disturbances due to his withdrawal, he currently rates his craving at 10/10. COWS 9. He currently rates his anxiety 9/10, depression 10/10, reports poor appetite and recent unwarranted weight loss, reports poor sleep. Nutrition consult added. Patient also complains of minimal urine output for the past 3 days, stating \"I'm not sure if it's from the meth or what\". Patient currently denies SI/HI/AVH, he does report feeling hopeless.    Patient educated on social distancing and frequent hand washing during COVID-19 pandemic.    Patient consented for rehab referrals to [facility], [facility], [facility], and [facility]; therapist completed referrals today.    Patient consented for sister Griselda Agosto.  Therapist conducted brief family session with patient and sister Griselda via speaker phone.  Griselda strongly encouraged patient to attend rehab upon discharge.  She discussed that patient has meeting with  next Wednesday and patient needed to contact  about admission.  Patient seemed agitated and refused to consent for or contact his .  He stated he did not want anyone else involved with his treatment other than his sister Griselda.  Griselda appeared supportive of patient's treatment.     Clinical Maneuvering/Intervention:     Therapist assisted patient in processing above session content; acknowledged and normalized patient’s thoughts, feelings, and concerns.  Discussed the therapist/patient relationship and explain the parameters and limitations of relative confidentiality.  Also discussed the importance of active participation, and honesty to the treatment process.  Encouraged the patient to discuss/vent their feelings, frustrations, and fears " concerning their ongoing medical issues and validated their feelings.     Discussed the importance of finding enjoyable activities and coping skills that the patient can engage in a regular basis. Discussed healthy coping skills such as distraction, self love, grounding, thought challenges/reframing, etc.  Provided patient with list of healthy coping skills this date. Discussed the importance of medication compliance.  Praised the patient for seeking help and spent the majority of the session building rapport.       Allowed patient to freely discuss issues without interruption or judgment. Provided safe, confidential environment to facilitate the development of positive therapeutic relationship and encourage open, honest communication.      Therapist addressed discharge safety planning this date. Assisted patient in identifying risk factors which would indicate the need for higher level of care after discharge;  including thoughts to harm self or others and/or self-harming behavior. Encouraged patient to call 911, or present to the nearest emergency room should any of these events occur. Discussed crisis intervention services and means to access.  Encouraged securing any objects of harm.       Therapist completed integrated summary, treatment plan, and initiated social history this date.  Therapist is strongly encouraging family involvement in treatment.       ASSESSMENT:      The patient displayed restricted affect and depressed mood.  He reported suicidal ideations and denied plan.  Patient denied HI and AVH.  He appeared oriented x3 with limited insight.   He reported poor sleep and fair appetite.     PLAN:       Patient to remain hospitalized this date.     Treatment team will focus efforts on stabilizing patient's acute symptoms while providing education on healthy coping and crisis management to reduce hospitalizations.   Patient requires daily psychiatrist evaluation and 24/7 nursing supervision to promote  patient  safety.     Therapist will offer 1-4 individual sessions, 1 therapy group daily, family education, and appropriate referral.    Therapist recommends rehab referral.  Patient consented for [facility], [facility], and [facility].  Referrals faxed today.

## 2020-06-04 NOTE — H&P
"INITIAL PSYCHIATRIC HISTORY & PHYSICAL    Patient Identification:  Name:  @  Age:   54 y.o.  Sex:   male  :   1965  MRN:   [id number]  Visit Number:   [id number]  Primary Care Physician:   Brian Kothari MD    SUBJECTIVE    CC/Focus of Exam: Depression/ Substance Abuse.     HPI: Brandon Dillon is a 54 y.o. male who was admitted on 6/3/2020 with complaints of want to die.   Mr. 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  Patient states he attempted to overdose during the week prior to admission.  Patient states he has been depressed \"for a long time\", worse since his wife left him in 2019.  Since then he has had periods of hopelessness feeling helpless and worthless and recent suicidal thoughts.  Seeking treatment at this time per sister's encouragement.  Patient has a long history of polysubstance abuse most recently involving benzodiazepines (5-10 alprazolam per day) and opiates (perhaps 50 mg of hydrocodone per day depending on access) of p.o. use.  Sporadic abuse of methamphetamine.  States he has had a substance abuse problem \"all my life\".  Patient states that he abstain from drug use several months prior to his marital separation in 2019 but is since used daily.  Available medical/psychiatric records reviewed and incorporated into the current document.     PAST PSYCHIATRIC HX: Patient has a prior admission here in May 2019 for depression and substance abuse.  See record.    SUBSTANCE USE HX:  As outlined above in the present illness. .         FAMILY HX: Patient states no history of significant mental illness nor substance abuse nor suicides among first-degree " "relatives.    SOCIAL HX: Patient in prison for 8 years for receiving stolen property release January 2019.  Denies current legal problems aside from his parole status still 2023.  Has lived with his sister the past 8 months after his marital separation.  Does not attend [employer].  Patient states he plans to enter a long-term chemical dependency treatment program post detox, is considering Suboxone.      Past Medical History:   Diagnosis Date   • Acid reflux    • Anxiety    • Asthma     as child   • Cirrhosis (CMS/[facility]) 2003   • Depression    • History of substance abuse (CMS/[facility])     Clean since May 31st 2012.    • Hypertension    • Migraines    • Suicide attempt (CMS/[facility])     \"Years ago, I took a bunch of pills.\"     Patient states he has completed treatment for hepatitis C.    Past Surgical History:   Procedure Laterality Date   • FACIAL FRACTURE SURGERY  2017       Family History   Problem Relation Age of Onset   • Dementia Mother    • Anxiety disorder Mother    • Heart disease Father    • Anxiety disorder Maternal Grandfather    • Depression Maternal Grandfather          Medications Prior to Admission   Medication Sig Dispense Refill Last Dose   • lisinopril-hydrochlorothiazide (PRINZIDE,ZESTORETIC) 20-12.5 MG per tablet Take 1 tablet by mouth Daily.   6/2/2020 at Unknown time   • pantoprazole (PROTONIX) 20 MG EC tablet Take 20 mg by mouth Daily.   6/2/2020 at Unknown time   • propranolol (INDERAL) 80 MG tablet Take 80 mg by mouth 2 (Two) Times a Day.   6/2/2020 at Unknown time   • albuterol sulfate  (90 Base) MCG/ACT inhaler Inhale 2 puffs Every 6 (Six) Hours As Needed for Wheezing. 1 inhaler 0 Unknown at Unknown time   • clonazePAM (KlonoPIN) 0.5 MG tablet Take 0.5 mg by mouth Daily As Needed for Anxiety.   Unknown at Unknown time   • traMADol (ULTRAM) 50 MG tablet Take 50 mg by mouth Every 6 (Six) Hours As Needed for Moderate Pain .   Unknown at Unknown time           ALLERGIES:  Sulfa antibiotics; " Neosporin [bacitracin-polymyxin b]; and Topamax [topiramate]    Temp:  [97.7 °F (36.5 °C)-98.2 °F (36.8 °C)] 97.7 °F (36.5 °C)  Heart Rate:  [54-60] 58  Resp:  [18-20] 18  BP: ()/(59-97) 114/70    REVIEW OF SYSTEMS:  Review of Systems   Constitutional: Negative.    HENT: Negative.    Eyes: Negative.    Respiratory: Negative.    Cardiovascular: Negative.    Gastrointestinal: Negative.         Abdominal discomfort with diarrhea   Endocrine: Negative.    Genitourinary: Negative.    Musculoskeletal: Negative.    Allergic/Immunologic: Negative.    Neurological: Negative.    Hematological: Negative.       See HPI for psychiatric ROS  OBJECTIVE    PHYSICAL EXAM:  Physical Exam   Constitutional: He is oriented to person, place, and time. He appears well-developed and well-nourished.   HENT:   Head: Normocephalic and atraumatic.   Eyes: Pupils are equal, round, and reactive to light. Conjunctivae and EOM are normal.   Neck: Normal range of motion. Neck supple.   Cardiovascular: Normal rate, regular rhythm, normal heart sounds and intact distal pulses.   Pulmonary/Chest: Effort normal and breath sounds normal.   Abdominal: Soft. Bowel sounds are normal.   Genitourinary:   Genitourinary Comments: Deferred   Musculoskeletal: Normal range of motion.   Neurological: He is alert and oriented to person, place, and time.   Skin: Skin is warm and dry.   Nursing note and vitals reviewed.      MENTAL STATUS EXAM:               Cooperation:  Cooperative  Eye Contact:  Good  Affect:  Depressed  Hopelessness: 5  Speech:  Normal  Thought Progress:  Goal directed  Thought Content:  Mood congruent  Suicidal:  Suicidal Ideation and recent attempt reported at admission.  Homicidal:  None  Hallucinations:  None  Delusion:  None  Memory:  Intact  Orientation:  Person, Place, Time and Situation  Reliability:  fair  Insight:  Fair  Judgement:  Fair  Impulse Control:  Fair      Imaging Results (Last 24 Hours)     ** No results found for the  last 24 hours. **           ECG/EMG Results (most recent)     Procedure Component Value Units Date/Time    ECG 12 Lead [[id number]] Collected:  06/03/20 2130     Updated:  06/03/20 2134    Narrative:       Test Reason : Baseline Cardiac Status  Blood Pressure : **/** mmHG  Vent. Rate : 051 BPM     Atrial Rate : 051 BPM     P-R Int : 172 ms          QRS Dur : 098 ms      QT Int : 446 ms       P-R-T Axes : 010 016 038 degrees     QTc Int : 411 ms    Sinus bradycardia  Otherwise normal ECG  When compared with ECG of 18-DEC-2019 13:23,  No significant change was found    Referred By:  PEDRO           Confirmed By:            Lab Results   Component Value Date    GLUCOSE 102 (H) 06/03/2020    BUN 5 (L) 06/03/2020    CREATININE 0.76 06/03/2020    EGFRIFNONA 108 06/03/2020    BCR 6.6 (L) 06/03/2020    CO2 26.7 06/03/2020    CALCIUM 9.7 06/03/2020    ALBUMIN 4.35 06/03/2020    AST 30 06/03/2020    ALT 22 06/03/2020       Lab Results   Component Value Date    WBC 8.77 06/03/2020    HGB 15.3 06/03/2020    HCT 45.8 06/03/2020    MCV 93.7 06/03/2020     06/03/2020       Pain Management Panel     Pain Management Panel Latest Ref Rng & Units 6/3/2020 11/13/2019    AMPHETAMINES SCREEN, URINE Negative Positive(A) Negative    BARBITURATES SCREEN Negative Negative Negative    BENZODIAZEPINE SCREEN, URINE Negative Negative Negative    BUPRENORPHINEUR Negative Positive(A) Negative    COCAINE SCREEN, URINE Negative Negative Negative    METHADONE SCREEN, URINE Negative Negative Negative          Brief Urine Lab Results  (Last result in the past 365 days)      Color   Clarity   Blood   Leuk Est   Nitrite   Protein   CREAT   Urine HCG        06/03/20 1614 Yellow Clear Negative Negative Negative Negative               Reviewed labs and studies done with this admission.       ASSESSMENT & PLAN:        Other recurrent depressive disorders (CMS/HCC)  Patient is on suicidal precautions, started on mirtazapine plus individual and group  psychotherapy    Hypertension  Lisinopril, hydrochlorothiazide and propanolol   history of substance abuse (CMS/[facility])  Clonidine detox for opiates, modified Lorazepam detox for benzodiazepines, will be encouraging referral for residential treatment post detox, and initial recovery principles incorporated into the current psychotherapeutic effort.        The patient has been admitted for safety and stabilization.  Patient will be monitored for suicidality daily and maintained on Special Precautions Level 3 (q15 min checks) . The patient will have individual and group therapy with a master's level therapist. A master treatment plan will be developed and agreed upon by the patient and his/her treatment team.  The patient's estimated length of stay in the hospital is 5-7 days.       I spent a total of 70 minutes in direct patient care, greater than 40 minutes (greater than 50%) were spent face-to-face with assessment, coordination of care, counseling,  and answering any questions the patient had regarding his depression and substance abuse and the treatment plan.     KIRILL Palacios MD    06/04/20  10:49 AM

## 2020-06-04 NOTE — NURSING NOTE
"New admission assessment complete. Patient reports he's \"just not happy anymore\" and needs \"to get off all the drugs\". He reports using Norco and meth on occasion, he is prescribed Klonopin and hasn't gotten his refill filled for 3 days. He states that he sometimes has suicidal thoughts to overdose, patient has had previous attempts but reports \"I always wake up\". He is , but lives alone. He complains of slight nausea, stomach cramps and tactile disturbances due to his withdrawal, he currently rates his craving at 10/10. COWS 9. He currently rates his anxiety 9/10, depression 10/10, reports poor appetite and recent unwarranted weight loss, reports poor sleep. Nutrition consult added. Patient also complains of minimal urine output for the past 3 days, stating \"I'm not sure if it's from the meth or what\". Patient currently denies SI/HI/AVH, he does report feeling hopeless.   "

## 2020-06-04 NOTE — PLAN OF CARE
Pt. Rates anx./dep 8 he denies s/I denies h/I he verbalzies craving 10 pleasant manner minimal interaction noted with peers

## 2020-06-05 PROCEDURE — 99232 SBSQ HOSP IP/OBS MODERATE 35: CPT | Performed by: PSYCHIATRY & NEUROLOGY

## 2020-06-05 RX ORDER — LORAZEPAM 0.5 MG/1
0.5 TABLET ORAL 3 TIMES DAILY
Status: DISPENSED | OUTPATIENT
Start: 2020-06-05 | End: 2020-06-06

## 2020-06-05 RX ADMIN — LORAZEPAM 1 MG: 1 TABLET ORAL at 08:38

## 2020-06-05 RX ADMIN — LORAZEPAM 0.5 MG: 0.5 TABLET ORAL at 15:20

## 2020-06-05 RX ADMIN — LISINOPRIL: 10 TABLET ORAL at 08:35

## 2020-06-05 RX ADMIN — LORAZEPAM 0.5 MG: 0.5 TABLET ORAL at 09:27

## 2020-06-05 RX ADMIN — PANTOPRAZOLE SODIUM 40 MG: 40 TABLET, DELAYED RELEASE ORAL at 08:35

## 2020-06-05 RX ADMIN — DICYCLOMINE HYDROCHLORIDE 10 MG: 10 CAPSULE ORAL at 14:48

## 2020-06-05 RX ADMIN — PROPRANOLOL HYDROCHLORIDE 80 MG: 20 TABLET ORAL at 08:36

## 2020-06-05 RX ADMIN — CLONAZEPAM 0.5 MG: 0.5 TABLET ORAL at 08:38

## 2020-06-05 RX ADMIN — NICOTINE 1 PATCH: 21 PATCH, EXTENDED RELEASE TRANSDERMAL at 09:26

## 2020-06-05 RX ADMIN — MIRTAZAPINE 15 MG: 15 TABLET, FILM COATED ORAL at 21:22

## 2020-06-05 NOTE — PLAN OF CARE
Problem: Patient Care Overview  Goal: Plan of Care Review  Outcome: Ongoing (interventions implemented as appropriate)  Flowsheets  Taken 6/5/2020 0500  Progress: no change  Outcome Summary: Pt isolates to room. Rates anxiety depression 7/10. Terenceies SI HI AVH.  Taken 6/4/2020 6791  Plan of Care Reviewed With: patient  Patient Agreement with Plan of Care: agrees

## 2020-06-05 NOTE — PLAN OF CARE
Problem: Patient Care Overview  Goal: Interprofessional Rounds/Family Conf  Flowsheets (Taken 6/5/2020 1519)  Participants: community support services  Summary: Therapist staffed case with [facility]; [facility] and [facility] this date for discharge planning.  Note:   Therapist spoke with [facility] who stated that they can take the patient on Monday at [facility]. They can pick the patient up at 12:00. Patient is to call [facility] on Monday if he would prefer to go to their facility. Spoke with [facility] who stated that the patient is approved clinically for their facility but he would have to complete an intake and assessment. Therapist transferred the call to the unit for the patient to complete assessment if he desired to do so.     Problem: Overarching Goals (Adult)  Goal: Optimized Coping Skills in Response to Life Stressors  Intervention: Promote Effective Coping Strategies  Flowsheets (Taken 6/5/2020 1519)  Supportive Measures: active listening utilized; counseling provided; decision-making supported; goal setting facilitated; positive reinforcement provided; self-care encouraged; verbalization of feelings encouraged; self-responsibility promoted     Problem: Overarching Goals (Adult)  Goal: Develops/Participates in Therapeutic [place] to Support Successful Transition  Intervention: Foster Therapeutic [place]  Flowsheets (Taken 6/5/2020 1035 by Marjorie Jorge, RN)  Trust Relationship/Rapport: care explained;choices provided;emotional support provided;empathic listening provided;questions answered;questions encouraged;reassurance provided;thoughts/feelings acknowledged     Problem: Overarching Goals (Adult)  Goal: Develops/Participates in Therapeutic [place] to Support Successful Transition  Intervention: Mutually Develop Transition Plan  Flowsheets (Taken 6/4/2020 1215 by Gracy Franco)  Transition Support: community resources reviewed;crisis management plan promoted;follow-up care  "discussed  Note:   Patient will plan to go to a residential facility at discharge.    Data: Therapist met with patient to assess patient's needs. Introduced myself as covering therapist. Patient was agreeable. Patient stated that he is doing \"pretty good today\". States that he is having some minor withdrawals from opiates which he has used for a long time. Patient and I discussed discharge plans. He has completed assessment with [facility] and has a bed for Monday. If he chooses to go to [facility] instead he can call them on Monday and complete assessment. He also completed intake with [facility] today.  Attempted to discuss diversionary techniques with patient but he said \"I don't enjoy anything\". Encouraged patient to allow family contact but he refused. States that they are not supportive.    Assessment: Patient states that he is having some minor withdrawals today with stomach aches. Identifies his depression at a 8; anxiety at a 7. No suicidal thoughts.     Plan: Patient will continue hospitalization thru the weekend. Will plan to go to [facility] on Monday. His ride will be here at 12:00. He has some other rehab options if he chooses. Refusing family contact.     "

## 2020-06-05 NOTE — PLAN OF CARE
Patient rates anxiety 6 and depression 8, denies thoughts to harm self and others, and denies hallucinations.

## 2020-06-05 NOTE — DISCHARGE PLACEMENT REQUEST
"Reji Dillon (54 y.o. Male)     Date of Birth Social Security Number Address Home Phone MRN    1965  [address] [facility] [address] [phone] [id number]    Jewish Marital Status          Anabaptism        Admission Date Admission Type Admitting Provider Attending Provider Department, Room/Bed    6/3/20 Urgent Dickson Palacios MD Briscoe, Brian T, MD [facility] ADULT PSYCHIATRIC, 1014/1S    Discharge Date Discharge Disposition Discharge Destination                       Attending Provider:  Dickson Palacios MD    Allergies:  Sulfa Antibiotics, Neosporin [Bacitracin-polymyxin B], Topamax [Topiramate]    Isolation:  None   Infection:  None   Code Status:  CPR    Ht:  177.8 cm (70\")   Wt:  85.7 kg (189 lb)    Admission Cmt:  None   Principal Problem:  Other recurrent depressive disorders (CMS/HCC) [F33.8]                 Active Insurance as of 6/3/2020     Primary Coverage     Payor Plan Insurance Group Employer/Plan Group    [employer] [place] [employer] [place]      Payor Plan Address Payor Plan Phone Number Payor Plan Fax Number Effective Dates    [address] [address] [phone]  2019 - None Entered    [place] [address]       Subscriber Name Subscriber Birth Date Member ID       REJI DILLON 1965 [id number]                 Emergency Contacts      (Rel.) Home Phone Work Phone Mobile Phone    TONI JOLLEY (Sister) [phone] -- --            Insurance Information                [facility]/WELLCARE MEDICAID Phone: [phone]    Subscriber: Reji Dillon Subscriber#: [id number]    Group#:  Precert#:              History & Physical      Yao Palacios MD at 20 1049          INITIAL PSYCHIATRIC HISTORY & PHYSICAL    Patient Identification:  Name:  @  Age:   54 y.o.  Sex:   male  :   1965  MRN:   [id number]  Visit Number:   [id number]  Primary Care Physician:   Brian Kothari MD    SUBJECTIVE    CC/Focus of Exam: Depression/ " "Substance Abuse.     HPI: Brandon Dillon is a 54 y.o. male who was admitted on 6/3/2020 with complaints of want to die.   Mr. 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  Patient states he attempted to overdose during the week prior to admission.  Patient states he has been depressed \"for a long time\", worse since his wife left him in October 2019.  Since then he has had periods of hopelessness feeling helpless and worthless and recent suicidal thoughts.  Seeking treatment at this time per sister's encouragement.  Patient has a long history of polysubstance abuse most recently involving benzodiazepines (5-10 alprazolam per day) and opiates (perhaps 50 mg of hydrocodone per day depending on access) of p.o. use.  Sporadic abuse of methamphetamine.  States he has had a substance abuse problem \"all my life\".  Patient states that he abstain from drug use several months prior to his marital separation in October 2019 but is since used daily.  Available medical/psychiatric records reviewed and incorporated into the current document.     PAST PSYCHIATRIC HX: Patient has a prior admission here in May 2019 for depression and substance abuse.  See record.    SUBSTANCE USE HX:   As outlined above in the present illness. .         FAMILY HX: Patient states no history of significant mental illness nor substance abuse nor suicides among first-degree relatives.    SOCIAL HX: Patient in custodial for 8 years for receiving stolen property release January 2019.  Denies current legal problems aside from his parole status still 2023.  Has lived with his sister the past 8 months after his marital separation.  Does not attend " "Jewish.  Patient states he plans to enter a long-term chemical dependency treatment program post detox, is considering Suboxone.      Past Medical History:   Diagnosis Date   • Acid reflux    • Anxiety    • Asthma     as child   • Cirrhosis (CMS/HCC) 2003   • Depression    • History of substance abuse (CMS/HCC)     Clean since May 31st 2012.    • Hypertension    • Migraines    • Suicide attempt (CMS/[facility])     \"Years ago, I took a bunch of pills.\"     Patient states he has completed treatment for hepatitis C.    Past Surgical History:   Procedure Laterality Date   • FACIAL FRACTURE SURGERY  2017       Family History   Problem Relation Age of Onset   • Dementia Mother    • Anxiety disorder Mother    • Heart disease Father    • Anxiety disorder Maternal Grandfather    • Depression Maternal Grandfather          Medications Prior to Admission   Medication Sig Dispense Refill Last Dose   • lisinopril-hydrochlorothiazide (PRINZIDE,ZESTORETIC) 20-12.5 MG per tablet Take 1 tablet by mouth Daily.   6/2/2020 at Unknown time   • pantoprazole (PROTONIX) 20 MG EC tablet Take 20 mg by mouth Daily.   6/2/2020 at Unknown time   • propranolol (INDERAL) 80 MG tablet Take 80 mg by mouth 2 (Two) Times a Day.   6/2/2020 at Unknown time   • albuterol sulfate  (90 Base) MCG/ACT inhaler Inhale 2 puffs Every 6 (Six) Hours As Needed for Wheezing. 1 inhaler 0 Unknown at Unknown time   • clonazePAM (KlonoPIN) 0.5 MG tablet Take 0.5 mg by mouth Daily As Needed for Anxiety.   Unknown at Unknown time   • traMADol (ULTRAM) 50 MG tablet Take 50 mg by mouth Every 6 (Six) Hours As Needed for Moderate Pain .   Unknown at Unknown time           ALLERGIES:  Sulfa antibiotics; Neosporin [bacitracin-polymyxin b]; and Topamax [topiramate]    Temp:  [97.7 °F (36.5 °C)-98.2 °F (36.8 °C)] 97.7 °F (36.5 °C)  Heart Rate:  [54-60] 58  Resp:  [18-20] 18  BP: ()/(59-97) 114/70    REVIEW OF SYSTEMS:  Review of Systems   Constitutional: Negative.    HENT: " Negative.    Eyes: Negative.    Respiratory: Negative.    Cardiovascular: Negative.    Gastrointestinal: Negative.         Abdominal discomfort with diarrhea   Endocrine: Negative.    Genitourinary: Negative.    Musculoskeletal: Negative.    Allergic/Immunologic: Negative.    Neurological: Negative.    Hematological: Negative.       See HPI for psychiatric ROS  OBJECTIVE    PHYSICAL EXAM:  Physical Exam   Constitutional: He is oriented to person, place, and time. He appears well-developed and well-nourished.   HENT:   Head: Normocephalic and atraumatic.   Eyes: Pupils are equal, round, and reactive to light. Conjunctivae and EOM are normal.   Neck: Normal range of motion. Neck supple.   Cardiovascular: Normal rate, regular rhythm, normal heart sounds and intact distal pulses.   Pulmonary/Chest: Effort normal and breath sounds normal.   Abdominal: Soft. Bowel sounds are normal.   Genitourinary:   Genitourinary Comments: Deferred   Musculoskeletal: Normal range of motion.   Neurological: He is alert and oriented to person, place, and time.   Skin: Skin is warm and dry.   Nursing note and vitals reviewed.      MENTAL STATUS EXAM:               Cooperation:  Cooperative  Eye Contact:  Good  Affect:  Depressed  Hopelessness: 5  Speech:  Normal  Thought Progress:  Goal directed  Thought Content:  Mood congruent  Suicidal:  Suicidal Ideation and recent attempt reported at admission.  Homicidal:  None  Hallucinations:  None  Delusion:  None  Memory:  Intact  Orientation:  Person, Place, Time and Situation  Reliability:  fair  Insight:  Fair  Judgement:  Fair  Impulse Control:  Fair      Imaging Results (Last 24 Hours)     ** No results found for the last 24 hours. **           ECG/EMG Results (most recent)     Procedure Component Value Units Date/Time    ECG 12 Lead [[id number]] Collected:  06/03/20 2130     Updated:  06/03/20 2134    Narrative:       Test Reason : Baseline Cardiac Status  Blood Pressure : **/** mmHG  Vent.  Rate : 051 BPM     Atrial Rate : 051 BPM     P-R Int : 172 ms          QRS Dur : 098 ms      QT Int : 446 ms       P-R-T Axes : 010 016 038 degrees     QTc Int : 411 ms    Sinus bradycardia  Otherwise normal ECG  When compared with ECG of 18-DEC-2019 13:23,  No significant change was found    Referred By:  PEDRO           Confirmed By:            Lab Results   Component Value Date    GLUCOSE 102 (H) 06/03/2020    BUN 5 (L) 06/03/2020    CREATININE 0.76 06/03/2020    EGFRIFNONA 108 06/03/2020    BCR 6.6 (L) 06/03/2020    CO2 26.7 06/03/2020    CALCIUM 9.7 06/03/2020    ALBUMIN 4.35 06/03/2020    AST 30 06/03/2020    ALT 22 06/03/2020       Lab Results   Component Value Date    WBC 8.77 06/03/2020    HGB 15.3 06/03/2020    HCT 45.8 06/03/2020    MCV 93.7 06/03/2020     06/03/2020       Pain Management Panel     Pain Management Panel Latest Ref Rng & Units 6/3/2020 11/13/2019    AMPHETAMINES SCREEN, URINE Negative Positive(A) Negative    BARBITURATES SCREEN Negative Negative Negative    BENZODIAZEPINE SCREEN, URINE Negative Negative Negative    BUPRENORPHINEUR Negative Positive(A) Negative    COCAINE SCREEN, URINE Negative Negative Negative    METHADONE SCREEN, URINE Negative Negative Negative          Brief Urine Lab Results  (Last result in the past 365 days)      Color   Clarity   Blood   Leuk Est   Nitrite   Protein   CREAT   Urine HCG        06/03/20 1614 Yellow Clear Negative Negative Negative Negative               Reviewed labs and studies done with this admission.       ASSESSMENT & PLAN:        Other recurrent depressive disorders (CMS/[facility])  Patient is on suicidal precautions, started on mirtazapine plus individual and group psychotherapy    Hypertension  Lisinopril, hydrochlorothiazide and propanolol   history of substance abuse (CMS/[facility])  Clonidine detox for opiates, modified Lorazepam detox for benzodiazepines, will be encouraging referral for residential treatment post detox, and initial recovery  principles incorporated into the current psychotherapeutic effort.        The patient has been admitted for safety and stabilization.  Patient will be monitored for suicidality daily and maintained on Special Precautions Level 3 (q15 min checks) . The patient will have individual and group therapy with a master's level therapist. A master treatment plan will be developed and agreed upon by the patient and his/her treatment team.  The patient's estimated length of stay in the hospital is 5-7 days.       I spent a total of 70 minutes in direct patient care, greater than 40 minutes (greater than 50%) were spent face-to-face with assessment, coordination of care, counseling,  and answering any questions the patient had regarding his depression and substance abuse and the treatment plan.     KIRILL Palacios MD    06/04/20  10:49 AM    Electronically signed by Yao Palacios MD at 06/04/20 1109         Current Facility-Administered Medications   Medication Dose Route Frequency Provider Last Rate Last Dose   • albuterol sulfate HFA (PROVENTIL HFA;VENTOLIN HFA;PROAIR HFA) inhaler 2 puff  2 puff Inhalation Q6H PRN Dickson Palacios MD       • aluminum-magnesium hydroxide-simethicone (MAALOX MAX) 400-400-40 MG/5ML suspension 15 mL  15 mL Oral Q6H PRN Dickson Palacios MD       • benzonatate (TESSALON) capsule 100 mg  100 mg Oral TID PRN Dickson Palacios MD       • clonazePAM (KlonoPIN) tablet 0.5 mg  0.5 mg Oral Daily PRN Dickson Palacios MD   0.5 mg at 06/05/20 0838   • cloNIDine (CATAPRES) tablet 0.1 mg  0.1 mg Oral TID PRN Dickson Palacios MD        Followed by   • [START ON 6/6/2020] cloNIDine (CATAPRES) tablet 0.1 mg  0.1 mg Oral BID PRN Dickson Palacios MD        Followed by   • [START ON 6/7/2020] cloNIDine (CATAPRES) tablet 0.1 mg  0.1 mg Oral Daily PRN Dickson aPlacios MD       • cyclobenzaprine (FLEXERIL) tablet 10 mg  10 mg Oral TID PRN Dickson Palacios MD       • dicyclomine (BENTYL) capsule 10  mg  10 mg Oral TID PRN Dickson Palacios MD       • famotidine (PEPCID) tablet 20 mg  20 mg Oral BID PRN Dickson Palacios MD       • ibuprofen (ADVIL,MOTRIN) tablet 400 mg  400 mg Oral Q6H PRN Dickson Palacios MD       • lisinopril (PRINIVIL,ZESTRIL) 20 mg, hydroCHLOROthiazide (HYDRODIURIL) 12.5 mg for ZESTORETIC 20-12.5   Oral Daily Dickson Palacios MD       • loperamide (IMODIUM) capsule 2 mg  2 mg Oral Q2H PRN Dickson Palacios MD       • magnesium hydroxide (MILK OF MAGNESIA) suspension 2400 mg/10mL 10 mL  10 mL Oral Daily PRN Dickson Palacios MD       • mirtazapine (REMERON) tablet 15 mg  15 mg Oral Nightly Yao Palacios MD   15 mg at 06/04/20 2027   • nicotine (NICODERM CQ) 21 MG/24HR patch 1 patch  1 patch Transdermal Q24H Dickson Palacios MD   1 patch at 06/04/20 0829   • ondansetron (ZOFRAN) tablet 4 mg  4 mg Oral Q6H PRN Dickson Palacios MD       • pantoprazole (PROTONIX) EC tablet 40 mg  40 mg Oral Daily Dickson Palacios MD   40 mg at 06/05/20 0835   • propranolol (INDERAL) tablet 80 mg  80 mg Oral BID Dickson Palacios MD   80 mg at 06/05/20 0836   • sodium chloride nasal spray 2 spray  2 spray Each Nare PRN Dickson Palacios MD

## 2020-06-05 NOTE — PROGRESS NOTES
"INPATIENT PSYCHIATRIC PROGRESS NOTE    Name:  Brandon Dillon  :  1965  MRN:  [id number]  Visit Number:  [id number]  Length of stay:  2    Behavioral Health Treatment Plan and Problem List: I have reviewed and approved the Behavioral Health Treatment Plan and Problem list.    SUBJECTIVE    CC/ Focus of exam: depression/ substance abuse.    Patient's subjective status: \"Not so good this morning\"    INTERVAL HISTORY: Patient remains depressed seemingly committed to complete the detox and enter a substance abuse rehabilitation program.  He continues to experience a sense of worthlessness and hopelessness but denies active suicidal intent.  Therapist sister and patient discussed his situation and treatment plan. Patient says he plans to contact his  Monday.  Patient having moderate difficulties with opiate type withdrawal symptoms with abdominal cramping and tactile sensations.  No tremor, no indications of evolving withdrawal from the benzodiazepines.  We will continue the detox protocol.  Depression rating 5/10  Anxiety rating 5/10  Sleep: Restless  Withdrawal sx: Abdominal discomfort  Cravin/10       Review of Systems   Respiratory: Negative.    Cardiovascular: Negative.    Neurological: Negative.          OBJECTIVE    Temp:  [97.1 °F (36.2 °C)-98.5 °F (36.9 °C)] 97.1 °F (36.2 °C)  Heart Rate:  [54-83] 82  Resp:  [18-20] 18  BP: ()/(59-72) 110/66    MENTAL STATUS EXAM:      Appearance:Casually dressed, good hygeine.   Cooperation:Cooperative  Psychomotor: No psychomotor agitation/retardation, No EPS, No motor tics  Speech-normal rate, amount.  Mood/Affect:  Depressed  Thought Processes:  coherent  Thought Content:  negativistic  Hallucination(s): none  Hopelessness: Yes  Optimistic:minimally  Suicidal Thoughts:   No  Suicidal Plan/Intent:  No  Homicidal Thoughts:  absent  Orientation: oriented x 3  Memory: recent intact    Lab Results (last 24 hours)     ** No results found for the last " 24 hours. **           Imaging Results (Last 24 Hours)     ** No results found for the last 24 hours. **           ECG/EMG Results (most recent)     Procedure Component Value Units Date/Time    ECG 12 Lead [[id number]] Collected:  20     Updated:  20 1512    Narrative:       Test Reason : Baseline Cardiac Status  Blood Pressure : **/** mmHG  Vent. Rate : 051 BPM     Atrial Rate : 051 BPM     P-R Int : 172 ms          QRS Dur : 098 ms      QT Int : 446 ms       P-R-T Axes : 010 016 038 degrees     QTc Int : 411 ms    Sinus bradycardia  Otherwise normal ECG  When compared with ECG of 18-DEC-2019 13:23,  No significant change was found  Confirmed by Lou Moore () on 2020 3:12:50 PM    Referred By:  PEDRO           Confirmed By:Lou Moore           ALLERGIES: Sulfa antibiotics; Neosporin [bacitracin-polymyxin b]; and Topamax [topiramate]      Current Facility-Administered Medications:   •  albuterol sulfate HFA (PROVENTIL HFA;VENTOLIN HFA;PROAIR HFA) inhaler 2 puff, 2 puff, Inhalation, Q6H PRN, Dickson Palacios MD  •  aluminum-magnesium hydroxide-simethicone (MAALOX MAX) 400-400-40 MG/5ML suspension 15 mL, 15 mL, Oral, Q6H PRN, Dickson Palacios MD  •  benzonatate (TESSALON) capsule 100 mg, 100 mg, Oral, TID PRN, Dickson Palacios MD  •  clonazePAM (KlonoPIN) tablet 0.5 mg, 0.5 mg, Oral, Daily PRN, Dickson Palacios MD, 0.5 mg at 20 0838  •  [] cloNIDine (CATAPRES) tablet 0.1 mg, 0.1 mg, Oral, 4x Daily PRN **FOLLOWED BY** cloNIDine (CATAPRES) tablet 0.1 mg, 0.1 mg, Oral, TID PRN **FOLLOWED BY** [START ON 2020] cloNIDine (CATAPRES) tablet 0.1 mg, 0.1 mg, Oral, BID PRN **FOLLOWED BY** [START ON 2020] cloNIDine (CATAPRES) tablet 0.1 mg, 0.1 mg, Oral, Daily PRN, Dickson Palacios MD  •  cyclobenzaprine (FLEXERIL) tablet 10 mg, 10 mg, Oral, TID PRN, Dickson Palacios MD  •  dicyclomine (BENTYL) capsule 10 mg, 10 mg, Oral, TID PRN, Dickson Palacios MD  •  famotidine  (PEPCID) tablet 20 mg, 20 mg, Oral, BID PRN, Dickson Palacios MD  •  ibuprofen (ADVIL,MOTRIN) tablet 400 mg, 400 mg, Oral, Q6H PRN, Dickson Palacios MD  •  lisinopril (PRINIVIL,ZESTRIL) 20 mg, hydroCHLOROthiazide (HYDRODIURIL) 12.5 mg for ZESTORETIC 20-12.5, , Oral, Daily, Dikcson Palacios MD  •  loperamide (IMODIUM) capsule 2 mg, 2 mg, Oral, Q2H PRN, Dickson Palacios MD  •  magnesium hydroxide (MILK OF MAGNESIA) suspension 2400 mg/10mL 10 mL, 10 mL, Oral, Daily PRN, Dickson Palacios MD  •  mirtazapine (REMERON) tablet 15 mg, 15 mg, Oral, Nightly, Yao Palacios MD, 15 mg at 06/04/20 2027  •  nicotine (NICODERM CQ) 21 MG/24HR patch 1 patch, 1 patch, Transdermal, Q24H, Dickson Palacios MD, 1 patch at 06/04/20 0829  •  ondansetron (ZOFRAN) tablet 4 mg, 4 mg, Oral, Q6H PRN, Dickson Palacios MD  •  pantoprazole (PROTONIX) EC tablet 40 mg, 40 mg, Oral, Daily, Dickson Palacios MD, 40 mg at 06/05/20 0835  •  propranolol (INDERAL) tablet 80 mg, 80 mg, Oral, BID, Dickson Palacios MD, 80 mg at 06/05/20 0836  •  sodium chloride nasal spray 2 spray, 2 spray, Each Nare, PRN, Dickson Palacios MD    ASSESSMENT & PLAN    Other recurrent depressive disorders (CMS/HCC)  Patient is on suicidal precautions, started on mirtazapine plus individual and group psychotherapy    Hypertension  Lisinopril, hydrochlorothiazide and propanolol   history of substance abuse (CMS/HCC)  Clonidine detox for opiates, modified Lorazepam detox for benzodiazepines, will be encouraging referral for residential treatment post detox, and initial recovery principles incorporated into the current psychotherapeutic effort.      Special precautions: Special Precautions Level 3 (q15 min checks)     Behavioral Health Treatment Plan and Problem List: I have reviewed and approved the Behavioral Health Treatment Plan and Problem list.    I spent a total of 25 minutes in direct patient care including  16 minutes face to face with the patient  assessment, coordination of care, and counseling the patient on the current and follow-up treatment plans regarding depression and substance abuse. Answered patient questions regarding the treatment plan. .    KIRILL Palacios MD    Clinician:  Yao Palacios MD  06/05/20  09:00    Dictated utilizing Dragon dictation

## 2020-06-06 PROCEDURE — 99232 SBSQ HOSP IP/OBS MODERATE 35: CPT | Performed by: PSYCHIATRY & NEUROLOGY

## 2020-06-06 RX ORDER — BUPROPION HYDROCHLORIDE 150 MG/1
150 TABLET ORAL DAILY
Status: DISCONTINUED | OUTPATIENT
Start: 2020-06-06 | End: 2020-06-08 | Stop reason: HOSPADM

## 2020-06-06 RX ADMIN — CLONAZEPAM 0.5 MG: 0.5 TABLET ORAL at 08:17

## 2020-06-06 RX ADMIN — MIRTAZAPINE 15 MG: 15 TABLET, FILM COATED ORAL at 20:04

## 2020-06-06 RX ADMIN — PANTOPRAZOLE SODIUM 40 MG: 40 TABLET, DELAYED RELEASE ORAL at 08:15

## 2020-06-06 RX ADMIN — NICOTINE 1 PATCH: 21 PATCH, EXTENDED RELEASE TRANSDERMAL at 08:15

## 2020-06-06 RX ADMIN — BUPROPION HYDROCHLORIDE 150 MG: 150 TABLET, FILM COATED, EXTENDED RELEASE ORAL at 13:12

## 2020-06-06 RX ADMIN — PROPRANOLOL HYDROCHLORIDE 80 MG: 20 TABLET ORAL at 20:03

## 2020-06-06 RX ADMIN — DICYCLOMINE HYDROCHLORIDE 10 MG: 10 CAPSULE ORAL at 19:04

## 2020-06-06 RX ADMIN — CYCLOBENZAPRINE 10 MG: 10 TABLET, FILM COATED ORAL at 19:04

## 2020-06-06 RX ADMIN — LORAZEPAM 0.5 MG: 0.5 TABLET ORAL at 08:15

## 2020-06-06 NOTE — PLAN OF CARE
Patient rates anxiety 9 and depression 10, denies thoughts of harming self and others, and denies hallucinations.

## 2020-06-06 NOTE — PLAN OF CARE
Problem: Patient Care Overview  Goal: Plan of Care Review  Outcome: Ongoing (interventions implemented as appropriate)  Flowsheets  Taken 6/6/2020 0515  Progress: no change  Outcome Summary: Pt in room most of shift. Rates anxiety depression 8/10. Denies SI HI AVH. Rates craving 8/10 with minimal w/d symptoms.  Taken 6/5/2020 2016  Plan of Care Reviewed With: patient  Patient Agreement with Plan of Care: agrees      Hemostasis: Aluminum Chloride Dressing: bandage Destruction After The Procedure: No Biopsy Type: H and E Size Of Lesion In Cm: 0 Silver Nitrate Text: The wound bed was treated with silver nitrate after the biopsy was performed. Cryotherapy Text: The wound bed was treated with cryotherapy after the biopsy was performed. Detail Level: Detailed Wound Care: Petrolatum Anesthesia Type: 1% lidocaine with 1:100,000 epinephrine and a 1:6 solution of 8.4% sodium bicarbonate Type Of Destruction Used: Curettage Curettage Text: The wound bed was treated with curettage after the biopsy was performed. Electrodesiccation And Curettage Text: The wound bed was treated with electrodesiccation and curettage after the biopsy was performed. Biopsy Method: Dermablade Billing Type: Third-Party Bill Anesthesia Volume In Cc: 0.5 Electrodesiccation Text: The wound bed was treated with electrodesiccation after the biopsy was performed.

## 2020-06-06 NOTE — PROGRESS NOTES
"INPATIENT PSYCHIATRIC PROGRESS NOTE    Name:  Brandon Dillon  :  1965  MRN:  [id number]  Visit Number:  [id number]  Length of stay:  3    SUBJECTIVE  CC/Focus of Exam: depression/substance abuse    INTERVAL HISTORY:  The patient reports he is not feeling good. Reports feeling depressed and experiencing withdrawals including stomach cramps. Reports no diarrhea or constipation.  Depression rating 10/10  Anxiety rating 8/10  Sleep: not too good  Withdrawal sx: abdominal cramps  Craving: 10/10    Review of Systems   Respiratory: Negative.    Cardiovascular: Negative.    Gastrointestinal: Positive for abdominal pain.   Psychiatric/Behavioral: Positive for dysphoric mood. The patient is nervous/anxious.        OBJECTIVE    Temp:  [97.3 °F (36.3 °C)-98.3 °F (36.8 °C)] 97.3 °F (36.3 °C)  Heart Rate:  [65-75] 68  Resp:  [18-20] 18  BP: (87-95)/(52-63) 89/59    MENTAL STATUS EXAM:  Appearance:Casually dressed, good hygeine.   Cooperation:Cooperative  Psychomotor: No psychomotor agitation/retardation, No EPS, No motor tics  Speech-normal rate, amount.  Mood \"depressed\"   Affect-congruent, appropriate, stable  Thought Content-goal directed, no delusional material present  Thought process-linear, organized.  Suicidality: No SI  Homicidality: No HI  Perception: No AH/VH  Insight-fair   Judgement-fair    Lab Results (last 24 hours)     ** No results found for the last 24 hours. **             Imaging Results (Last 24 Hours)     ** No results found for the last 24 hours. **             ECG/EMG Results (most recent)     Procedure Component Value Units Date/Time    ECG 12 Lead [[id number]] Collected:  20     Updated:  20 1512    Narrative:       Test Reason : Baseline Cardiac Status  Blood Pressure : **/** mmHG  Vent. Rate : 051 BPM     Atrial Rate : 051 BPM     P-R Int : 172 ms          QRS Dur : 098 ms      QT Int : 446 ms       P-R-T Axes : 010 016 038 degrees     QTc Int : 411 ms    Sinus " bradycardia  Otherwise normal ECG  When compared with ECG of 18-DEC-2019 13:23,  No significant change was found  Confirmed by Lou Moore () on 2020 3:12:50 PM    Referred By:  PEDRO           Confirmed By:Lou Moore           ALLERGIES: Sulfa antibiotics; Neosporin [bacitracin-polymyxin b]; and Topamax [topiramate]      Current Facility-Administered Medications:   •  albuterol sulfate HFA (PROVENTIL HFA;VENTOLIN HFA;PROAIR HFA) inhaler 2 puff, 2 puff, Inhalation, Q6H PRN, Dickson Palacios MD  •  aluminum-magnesium hydroxide-simethicone (MAALOX MAX) 400-400-40 MG/5ML suspension 15 mL, 15 mL, Oral, Q6H PRN, Dickson Palacios MD  •  benzonatate (TESSALON) capsule 100 mg, 100 mg, Oral, TID PRN, Dickson Palacios MD  •  clonazePAM (KlonoPIN) tablet 0.5 mg, 0.5 mg, Oral, Daily PRN, Dickson Palacios MD, 0.5 mg at 20 0817  •  [] cloNIDine (CATAPRES) tablet 0.1 mg, 0.1 mg, Oral, 4x Daily PRN **FOLLOWED BY** [] cloNIDine (CATAPRES) tablet 0.1 mg, 0.1 mg, Oral, TID PRN **FOLLOWED BY** cloNIDine (CATAPRES) tablet 0.1 mg, 0.1 mg, Oral, BID PRN **FOLLOWED BY** [START ON 2020] cloNIDine (CATAPRES) tablet 0.1 mg, 0.1 mg, Oral, Daily PRN, Dickson Palacios MD  •  cyclobenzaprine (FLEXERIL) tablet 10 mg, 10 mg, Oral, TID PRN, Dickson Palacios MD  •  dicyclomine (BENTYL) capsule 10 mg, 10 mg, Oral, TID PRN, Dickson Palacios MD, 10 mg at 20 1448  •  famotidine (PEPCID) tablet 20 mg, 20 mg, Oral, BID PRN, Dickson Palacios MD  •  ibuprofen (ADVIL,MOTRIN) tablet 400 mg, 400 mg, Oral, Q6H PRN, Dickson Palacios MD  •  lisinopril (PRINIVIL,ZESTRIL) 20 mg, hydroCHLOROthiazide (HYDRODIURIL) 12.5 mg for ZESTORETIC 20-12.5, , Oral, Daily, Dickson Palacios MD  •  loperamide (IMODIUM) capsule 2 mg, 2 mg, Oral, Q2H PRN, Dickson Palacios MD  •  LORazepam (ATIVAN) tablet 0.5 mg, 0.5 mg, Oral, TID, Yao Palacios MD, 0.5 mg at 20 0815  •  magnesium hydroxide (MILK OF MAGNESIA)  suspension 2400 mg/10mL 10 mL, 10 mL, Oral, Daily PRN, Dickson Palacios MD  •  mirtazapine (REMERON) tablet 15 mg, 15 mg, Oral, Nightly, Yao Palacios MD, 15 mg at 06/05/20 2122  •  nicotine (NICODERM CQ) 21 MG/24HR patch 1 patch, 1 patch, Transdermal, Q24H, Dickson Palacios MD, 1 patch at 06/06/20 0815  •  ondansetron (ZOFRAN) tablet 4 mg, 4 mg, Oral, Q6H PRN, Dickson Palacios MD  •  pantoprazole (PROTONIX) EC tablet 40 mg, 40 mg, Oral, Daily, Dickson Palacios MD, 40 mg at 06/06/20 0815  •  propranolol (INDERAL) tablet 80 mg, 80 mg, Oral, BID, Dickson Palacios MD, 80 mg at 06/05/20 0836  •  sodium chloride nasal spray 2 spray, 2 spray, Each Nare, PRN, Dickson Palacios MD    ASSESSMENT & PLAN:      Other recurrent depressive disorders (CMS/HCC)  - Continue mirtazapine  - Start Wellbutrin as patient reports it helped him the last time.      Hypertension  - Continue lisinopril, hctz and propranolol      History of substance abuse (CMS/HCC)  - Continue clonidine and lorazepam detox    Special precautions: Special Precautions Level 3 (q15 min checks) .    Behavioral Health Treatment Plan and Problem List: I have reviewed and approved the Behavioral Health Treatment Plan and Problem list.  The patient has had a chance to review and agrees with the treatment plan.     Clinician:  Jessica Darby MD  06/06/20  12:41

## 2020-06-06 NOTE — NURSING NOTE
0855 PATIENTS BP WAS LOW. SEE CHART. PATIENT VERBALIZES NO NEEDS AND NO S/S OF ACUTE DISTRESS NOTED. DECLAN BECKER NOTIFIED OF PATIENTS VITAL SIGNS AND OF THE MEDICATIONS THE PATIENT RECEIVED AND MEDICATIONS THAT WERE HELD.

## 2020-06-07 PROCEDURE — 99232 SBSQ HOSP IP/OBS MODERATE 35: CPT | Performed by: PSYCHIATRY & NEUROLOGY

## 2020-06-07 RX ADMIN — PROPRANOLOL HYDROCHLORIDE 80 MG: 20 TABLET ORAL at 20:20

## 2020-06-07 RX ADMIN — CLONAZEPAM 0.5 MG: 0.5 TABLET ORAL at 08:12

## 2020-06-07 RX ADMIN — PROPRANOLOL HYDROCHLORIDE 80 MG: 20 TABLET ORAL at 08:13

## 2020-06-07 RX ADMIN — IBUPROFEN 400 MG: 400 TABLET, FILM COATED ORAL at 08:12

## 2020-06-07 RX ADMIN — BUPROPION HYDROCHLORIDE 150 MG: 150 TABLET, FILM COATED, EXTENDED RELEASE ORAL at 08:12

## 2020-06-07 RX ADMIN — NICOTINE 1 PATCH: 21 PATCH, EXTENDED RELEASE TRANSDERMAL at 08:12

## 2020-06-07 RX ADMIN — IBUPROFEN 400 MG: 400 TABLET, FILM COATED ORAL at 20:23

## 2020-06-07 RX ADMIN — PANTOPRAZOLE SODIUM 40 MG: 40 TABLET, DELAYED RELEASE ORAL at 08:12

## 2020-06-07 RX ADMIN — DICYCLOMINE HYDROCHLORIDE 10 MG: 10 CAPSULE ORAL at 08:12

## 2020-06-07 RX ADMIN — LISINOPRIL: 10 TABLET ORAL at 08:13

## 2020-06-07 RX ADMIN — MIRTAZAPINE 15 MG: 15 TABLET, FILM COATED ORAL at 20:21

## 2020-06-07 NOTE — PROGRESS NOTES
"INPATIENT PSYCHIATRIC PROGRESS NOTE    Name:  Brandon Dillon  :  1965  MRN:  [id number]  Visit Number:  [id number]  Length of stay:  4    SUBJECTIVE  CC/Focus of Exam: depression/substance abuse    INTERVAL HISTORY:  The patient reports he is not feeling any better. Started Wellbutrin XL yesterday but reports no effect and also reports no adverse effects and agreed to continue taking it.  Depression rating 1010  Anxiety rating 10/10  Sleep: not too good  Withdrawal sx: abdominal cramps  Craving: 10/10    Review of Systems   Respiratory: Negative.    Cardiovascular: Negative.    Psychiatric/Behavioral: Positive for dysphoric mood. The patient is nervous/anxious.        OBJECTIVE    Temp:  [97.4 °F (36.3 °C)-98.4 °F (36.9 °C)] 97.8 °F (36.6 °C)  Heart Rate:  [66-86] 66  Resp:  [18] 18  BP: ()/() 124/91    MENTAL STATUS EXAM:  Appearance:Casually dressed, good hygeine.   Cooperation:Cooperative  Psychomotor: No psychomotor agitation/retardation, No EPS, No motor tics  Speech-normal rate, amount.  Mood \"depressed\"   Affect-congruent, appropriate, stable  Thought Content-goal directed, no delusional material present  Thought process-linear, organized.  Suicidality: No SI  Homicidality: No HI  Perception: No AH/VH  Insight-fair   Judgement-fair    Lab Results (last 24 hours)     ** No results found for the last 24 hours. **             Imaging Results (Last 24 Hours)     ** No results found for the last 24 hours. **             ECG/EMG Results (most recent)     Procedure Component Value Units Date/Time    ECG 12 Lead [[id number]] Collected:  20     Updated:  20 1512    Narrative:       Test Reason : Baseline Cardiac Status  Blood Pressure : **/** mmHG  Vent. Rate : 051 BPM     Atrial Rate : 051 BPM     P-R Int : 172 ms          QRS Dur : 098 ms      QT Int : 446 ms       P-R-T Axes : 010 016 038 degrees     QTc Int : 411 ms    Sinus bradycardia  Otherwise normal ECG  When compared with " ECG of 18-DEC-2019 13:23,  No significant change was found  Confirmed by Lou Moore () on 2020 3:12:50 PM    Referred By:  PEDRO           Confirmed By:Lou Moore           ALLERGIES: Sulfa antibiotics; Neosporin [bacitracin-polymyxin b]; and Topamax [topiramate]      Current Facility-Administered Medications:   •  albuterol sulfate HFA (PROVENTIL HFA;VENTOLIN HFA;PROAIR HFA) inhaler 2 puff, 2 puff, Inhalation, Q6H PRN, Dickson Palacios MD  •  aluminum-magnesium hydroxide-simethicone (MAALOX MAX) 400-400-40 MG/5ML suspension 15 mL, 15 mL, Oral, Q6H PRN, Dickson Palacios MD  •  benzonatate (TESSALON) capsule 100 mg, 100 mg, Oral, TID PRN, Dickson Palacios MD  •  buPROPion XL (WELLBUTRIN XL) 24 hr tablet 150 mg, 150 mg, Oral, Daily, Jessica Darby MD, 150 mg at 20 0812  •  clonazePAM (KlonoPIN) tablet 0.5 mg, 0.5 mg, Oral, Daily PRN, Dickson Palacios MD, 0.5 mg at 20 0812  •  [] cloNIDine (CATAPRES) tablet 0.1 mg, 0.1 mg, Oral, 4x Daily PRN **FOLLOWED BY** [] cloNIDine (CATAPRES) tablet 0.1 mg, 0.1 mg, Oral, TID PRN **FOLLOWED BY** [] cloNIDine (CATAPRES) tablet 0.1 mg, 0.1 mg, Oral, BID PRN **FOLLOWED BY** cloNIDine (CATAPRES) tablet 0.1 mg, 0.1 mg, Oral, Daily PRN, Dickson Palacios MD  •  cyclobenzaprine (FLEXERIL) tablet 10 mg, 10 mg, Oral, TID PRN, Dickson Palacios MD, 10 mg at 20 1904  •  dicyclomine (BENTYL) capsule 10 mg, 10 mg, Oral, TID PRN, Dickson Palacios MD, 10 mg at 20 0812  •  famotidine (PEPCID) tablet 20 mg, 20 mg, Oral, BID PRN, Dickson Palacios MD  •  ibuprofen (ADVIL,MOTRIN) tablet 400 mg, 400 mg, Oral, Q6H PRN, Dickson Palacios MD, 400 mg at 20 08  •  lisinopril (PRINIVIL,ZESTRIL) 20 mg, hydroCHLOROthiazide (HYDRODIURIL) 12.5 mg for ZESTORETIC 20-12.5, , Oral, Daily, Dickson Palacios MD  •  loperamide (IMODIUM) capsule 2 mg, 2 mg, Oral, Q2H PRN, Dickson Palacios MD  •  magnesium hydroxide (MILK OF MAGNESIA) suspension  2400 mg/10mL 10 mL, 10 mL, Oral, Daily PRN, Dickson Palacios MD  •  mirtazapine (REMERON) tablet 15 mg, 15 mg, Oral, Nightly, Yao Palacios MD, 15 mg at 06/06/20 2004  •  nicotine (NICODERM CQ) 21 MG/24HR patch 1 patch, 1 patch, Transdermal, Q24H, Dickson Palacios MD, 1 patch at 06/07/20 0812  •  ondansetron (ZOFRAN) tablet 4 mg, 4 mg, Oral, Q6H PRN, Dickson Palacios MD  •  pantoprazole (PROTONIX) EC tablet 40 mg, 40 mg, Oral, Daily, Dickson Palacios MD, 40 mg at 06/07/20 0812  •  propranolol (INDERAL) tablet 80 mg, 80 mg, Oral, BID, Dickson Palacios MD, 80 mg at 06/07/20 0813  •  sodium chloride nasal spray 2 spray, 2 spray, Each Nare, PRN, Dickson Palacios MD    ASSESSMENT & PLAN:      Other recurrent depressive disorders (CMS/HCC)  - Continue mirtazapine  - Conitnue Wellbutrin as patient reports it helped him the last time.      Hypertension  - Continue lisinopril, hctz and propranolol      History of substance abuse (CMS/HCC)  - Continue clonidine and lorazepam detox    Special precautions: Special Precautions Level 3 (q15 min checks) .    Behavioral Health Treatment Plan and Problem List: I have reviewed and approved the Behavioral Health Treatment Plan and Problem list.  The patient has had a chance to review and agrees with the treatment plan.     Clinician:  Jessica Darby MD  06/07/20  11:50

## 2020-06-07 NOTE — PLAN OF CARE
PT IS VERY IRRITABLE WITH STAFF AND ONLY MINIMALLY COOPERATIVE WITH NURSING ASSESSMENT. PT ISOLATES TO HIS ROOM FOR THE MAJORITY OF THE SHIFT. ANXIETY AND DEPRESSION RATED 10/10, NO SI, HI, OR AVH.

## 2020-06-07 NOTE — PLAN OF CARE
Problem: Patient Care Overview  Goal: Plan of Care Review  Outcome: Ongoing (interventions implemented as appropriate)  Flowsheets  Taken 6/7/2020 0501  Progress: no change  Outcome Summary: Pt reports feeling frustrated d/t his ativan being dc'd yesterday. Rates anxiety 10/10 denies depression. Denies SI HI AVH. Denies craving with minimal w/d symptoms.  Taken 6/6/2020 2057  Plan of Care Reviewed With: patient  Patient Agreement with Plan of Care: agrees

## 2020-06-08 VITALS
HEIGHT: 70 IN | RESPIRATION RATE: 16 BRPM | OXYGEN SATURATION: 97 % | HEART RATE: 52 BPM | DIASTOLIC BLOOD PRESSURE: 79 MMHG | BODY MASS INDEX: 27.06 KG/M2 | SYSTOLIC BLOOD PRESSURE: 130 MMHG | TEMPERATURE: 97.6 F | WEIGHT: 189 LBS

## 2020-06-08 PROCEDURE — 99239 HOSP IP/OBS DSCHRG MGMT >30: CPT | Performed by: PSYCHIATRY & NEUROLOGY

## 2020-06-08 RX ORDER — ALBUTEROL SULFATE 90 UG/1
2 AEROSOL, METERED RESPIRATORY (INHALATION) EVERY 6 HOURS PRN
Qty: 1 INHALER | Refills: 0 | Status: SHIPPED | OUTPATIENT
Start: 2020-06-08 | End: 2020-06-08 | Stop reason: SDUPTHER

## 2020-06-08 RX ORDER — LISINOPRIL AND HYDROCHLOROTHIAZIDE 20; 12.5 MG/1; MG/1
1 TABLET ORAL DAILY
Qty: 30 TABLET | Refills: 0 | Status: SHIPPED | OUTPATIENT
Start: 2020-06-08

## 2020-06-08 RX ORDER — ALBUTEROL SULFATE 90 UG/1
2 AEROSOL, METERED RESPIRATORY (INHALATION) EVERY 6 HOURS PRN
Qty: 1 INHALER | Refills: 0 | Status: SHIPPED | OUTPATIENT
Start: 2020-06-08 | End: 2020-06-29 | Stop reason: SDUPTHER

## 2020-06-08 RX ORDER — PROPRANOLOL HYDROCHLORIDE 80 MG/1
80 TABLET ORAL 2 TIMES DAILY
Qty: 60 TABLET | Refills: 0 | Status: ON HOLD | OUTPATIENT
Start: 2020-06-08 | End: 2020-07-02

## 2020-06-08 RX ORDER — PANTOPRAZOLE SODIUM 20 MG/1
20 TABLET, DELAYED RELEASE ORAL DAILY
Qty: 30 TABLET | Refills: 0 | Status: ON HOLD | OUTPATIENT
Start: 2020-06-08 | End: 2020-07-02

## 2020-06-08 RX ORDER — BUPROPION HYDROCHLORIDE 150 MG/1
150 TABLET ORAL DAILY
Qty: 30 TABLET | Refills: 0 | Status: ON HOLD | OUTPATIENT
Start: 2020-06-09 | End: 2020-07-02

## 2020-06-08 RX ADMIN — PROPRANOLOL HYDROCHLORIDE 80 MG: 20 TABLET ORAL at 08:05

## 2020-06-08 RX ADMIN — BUPROPION HYDROCHLORIDE 150 MG: 150 TABLET, FILM COATED, EXTENDED RELEASE ORAL at 08:05

## 2020-06-08 RX ADMIN — PANTOPRAZOLE SODIUM 40 MG: 40 TABLET, DELAYED RELEASE ORAL at 08:05

## 2020-06-08 RX ADMIN — NICOTINE 1 PATCH: 21 PATCH, EXTENDED RELEASE TRANSDERMAL at 08:06

## 2020-06-08 RX ADMIN — DICYCLOMINE HYDROCHLORIDE 10 MG: 10 CAPSULE ORAL at 08:05

## 2020-06-08 RX ADMIN — CLONAZEPAM 0.5 MG: 0.5 TABLET ORAL at 08:05

## 2020-06-08 RX ADMIN — LISINOPRIL: 10 TABLET ORAL at 08:05

## 2020-06-08 RX ADMIN — IBUPROFEN 400 MG: 400 TABLET, FILM COATED ORAL at 08:05

## 2020-06-08 NOTE — PLAN OF CARE
Problem: Patient Care Overview  Goal: Interprofessional Rounds/Family Conf  2020 09 by Gracy Franco  Flowsheets (Taken 2020)  Participants: milieu/psych techs; nursing; psychiatrist; family  Summary: Treatment team staffing; spoke with patient's sister.     Data:     Therapist met with patient for individual session.  Continued to discuss progress and treatment goals.  Educated patient about importance of safety and aftercare plans.  Reviewed patient's chart and staffed with treatment team.     Patient discussed feeling anxious today but better.  He reported medications to be helpful.  Patient seemed to minimize substance abuse issues.  He stated he has good will power and did not think he would benefit from rehab.  Patient discussed that he will likely be court ordered to attend rehab so he plans to go.  Patient agreeable to attend rehab with [facility] upon discharge today.  Patient denied withdrawal symptoms.  He denied acute symptoms.      Patient consented for [facility] [place], and asked therapist to contact his  Fransisco Rajput.  Therapist spoke with Fransisco via phone and provided update.  He reported patient may attend rehab with [facility] and that patient is to call when patient arrives at the rehab.    Patient consented for his sister Griselda Agosto.  Therapist spoke with Griselda via phone.  Reviewed aftercare.  She discussed that patient has had prescription drug problem that likely started as a teenager when patient was prescribed Xanax after patient's father .  Griselda discussed that she will drop off clothes and a little money this morning for patient to take to rehab.  She appeared supportive of patient's treatment.    Assessment:    Patient is observed to display appropriate affect and anxious mood.  Patient adamantly denied SI, HI, and AVH.  Patient oriented x3 with limited insight.  He reported fair sleep and fair  appetite.    Plan:    Therapist will continue to assist daily with aftercare and safety planning as needed.  Patient to attend rehab with [facility] today upon discharge.  Their staff will  patient at 12 PM.

## 2020-06-08 NOTE — PLAN OF CARE
Problem: Patient Care Overview  Goal: Plan of Care Review  Outcome: Ongoing (interventions implemented as appropriate)  Flowsheets  Taken 6/8/2020 0508  Progress: no change  Outcome Summary: Pt rates anxiety9/10 depression 6/10. Denies SI HI AVH.  Taken 6/7/2020 2022  Plan of Care Reviewed With: patient  Patient Agreement with Plan of Care: agrees

## 2020-06-08 NOTE — PROGRESS NOTES
Behavioral Health Discharge Summary             Please fax within 24 hours of discharge to [facility] at: [phone]      Member Name: Brandon Dillon Member ID: [id number]   Authorization Number: [id number] Phone: [phone]   Member Address: [address] [place] [address]   06/08/2020 Level of Care at Discharge:    Facility: [facility] Staff Completing Form: Patti BEACH RN   If the member is being discharged directly to a residential or extended care program, please specify the type below.   __Private Child-Caring Facility (PCC) Residential/Group Home   __Private Child-Caring Facility (PCC) Therapeutic Foster Care   __Residential Treatment Facility (RTF)   __Psychiatric Residential Treatment Facility (PRTF I or II)   __Long-Term Acute Inpatient Hospital Services or Extended Care Unit (ECU)   __Other (please specify):    Brief discharge summary of treatment received (for follow up by the case management team): D/C clinical with list of medications and follow up appts given to patient upon discharge.     BRIEF SUMMARY OF RECOMMENDATIONS FOR ONGOING TREATMENT     Discharged to where: [facility]    Discharge diagnoses:    Axis I:    Axis II:    Axis III:    Axis IV:    Axis V:    Does the member understand his/her DX?  Yes          Medication     Dose     Schedule Supply/  Quantity  Given at Discharge RX Provided  Yes/No  If Rx Provided, Quantity RX Prior Auth Required  Yes/No Prior Auth  Completed   Wellbutrin  mg  Daily                                                                                         Does the member understand the reason for taking these medications? Yes                                                           FOLLOW-UP APPOINTMENTS   Please schedule within 7 days of discharge and provide appointment details for all referred services.    PCP/Other Providers Involved in Treatment:    Appointment Type: IP Rehab  Provider Name: Fouzia Webster   Provider Phone: [phone] Appointment Date: 06/08/2020 Appointment Time: Admit upon arrival     Assessment   (new to OP services)        Case Management    Is the member already enrolled in case management?  Yes/No  If yes, date the CM was notified:    If no, was the CM referral offered?  Yes/No  Accepted? Yes/No    Is the Release of Information in the chart? Yes/No:      Medication Management (for member discharged with psychiatric medications):      A&D Treatment (for member with substance abuse/   dependence in the past year):      Medical Condition (for member with a medical condition):    Other recommended treatment:    Do you have any concerns about the discharge plan?  No    If yes, explain:    Was the member involved in the discharge planning?  Yes    If no, explain:    Was a copy of the discharge plan provided to the member?  Yes    If no, explain:

## 2020-06-08 NOTE — DISCHARGE SUMMARY
This patient visit is electronic.  The patient gave consent to be seen remotely, and when consent is given they understand that the consent allows for patient identifiable information to be sent to a third party as needed.   They may refuse to be seen remotely at any time. The electronic data is encrypted and password protected, and the patient has been advised of the potential risks to privacy not withstanding such measures.      Date of Discharge:  6/8/2020    Discharge Diagnosis:Principal Problem:    Other recurrent depressive disorders (CMS/HCC)  Active Problems:    Hypertension    History of substance abuse (CMS/HCC)        Presenting Problem/History of Present Illness:Patient was admitted to the hospital on [address]   having presented depressed voicing suicidal intent, voluntarily admitted for safety evaluation and treatment, see admission note for details.         Hospital Course:  Patient was admitted for safety and stabilization and was placed on standard precautions.  Routine labs were checked.  Patient was assigned a masters level therapist and provided with an opportunity to participate in group and individual therapy on the unit.  Patient seen on a daily basis for evaluation and supportive therapy.  Patient placed on clonidine as needed detox and a modified Lorazepam detox.  Withdrawal symptoms relatively mild, no complications.  His depression improved steadily, was started on bupropion which he is to continue post discharge.  [facility].  At discharge patient free of any thoughts of harming self or others, free of any paranoia or other formal thought disorder.  Patient's  to be notified of the discharge and treatment plan, not to proceed till the  (Phil Rajput) gives his approval.  See below for medications.    Consults:   Consults     No orders found from 5/5/2020 to 6/4/2020.           Labs:  Lab Results (all)     None          Imaging:  Imaging Results (All)     None          Condition on Discharge:  improved    Prognosis: Guarded due to the patient's history of failed recovery efforts, perhaps is succeed this time.    Vital Signs  Temp:  [97.5 °F (36.4 °C)-98.1 °F (36.7 °C)] 97.6 °F (36.4 °C)  Heart Rate:  [52-71] 52  Resp:  [16-18] 16  BP: ()/(60-84) 130/79    Discharge Disposition  Rehab Facility or Unit (DC - External)    Discharge Medications     Discharge Medications      New Medications      Instructions Start Date   buPROPion  MG 24 hr tablet  Commonly known as:  WELLBUTRIN XL   150 mg, Oral, Daily   Start Date:  June 9, 2020        Continue These Medications      Instructions Start Date   albuterol sulfate  (90 Base) MCG/ACT inhaler  Commonly known as:  PROVENTIL HFA;VENTOLIN HFA;PROAIR HFA   2 puffs, Inhalation, Every 6 Hours PRN      lisinopril-hydrochlorothiazide 20-12.5 MG per tablet  Commonly known as:  PRINZIDE,ZESTORETIC   1 tablet, Oral, Daily      pantoprazole 20 MG EC tablet  Commonly known as:  PROTONIX   20 mg, Oral, Daily      propranolol 80 MG tablet  Commonly known as:  INDERAL   80 mg, Oral, 2 Times Daily         Stop These Medications    clonazePAM 0.5 MG tablet  Commonly known as:  KlonoPIN     traMADol 50 MG tablet  Commonly known as:  ULTRAM            Discharge Diet: Regular    Activity at Discharge: No restrictions    Follow-up Appointments: [facility] of discharge          Yao Palacios MD  06/08/20  08:51  Time spent with the discharge process >30 minutes.     Dictated utilizing Dragon dictation

## 2020-06-29 ENCOUNTER — APPOINTMENT (OUTPATIENT)
Dept: CT IMAGING | Facility: HOSPITAL | Age: 55
End: 2020-06-29

## 2020-06-29 ENCOUNTER — HOSPITAL ENCOUNTER (EMERGENCY)
Facility: HOSPITAL | Age: 55
Discharge: HOME | End: 2020-06-29
Attending: EMERGENCY MEDICINE | Admitting: EMERGENCY MEDICINE

## 2020-06-29 VITALS
HEIGHT: 70 IN | TEMPERATURE: 98.1 F | RESPIRATION RATE: 20 BRPM | HEART RATE: 82 BPM | OXYGEN SATURATION: 98 % | WEIGHT: 188 LBS | SYSTOLIC BLOOD PRESSURE: 127 MMHG | DIASTOLIC BLOOD PRESSURE: 82 MMHG | BODY MASS INDEX: 26.92 KG/M2

## 2020-06-29 DIAGNOSIS — J18.9 ATYPICAL PNEUMONIA: Primary | ICD-10-CM

## 2020-06-29 LAB
ALBUMIN SERPL-MCNC: 4.08 G/DL (ref 3.5–5.2)
ALBUMIN/GLOB SERPL: 1.3 G/DL
ALP SERPL-CCNC: 87 U/L (ref 39–117)
ALT SERPL W P-5'-P-CCNC: 19 U/L (ref 1–41)
ANION GAP SERPL CALCULATED.3IONS-SCNC: 11.3 MMOL/L (ref 5–15)
AST SERPL-CCNC: 24 U/L (ref 1–40)
B PERT DNA SPEC QL NAA+PROBE: NOT DETECTED
BASOPHILS # BLD AUTO: 0.11 10*3/MM3 (ref 0–0.2)
BASOPHILS NFR BLD AUTO: 0.9 % (ref 0–1.5)
BILIRUB SERPL-MCNC: 0.6 MG/DL (ref 0.2–1.2)
BUN BLD-MCNC: 17 MG/DL (ref 6–20)
BUN/CREAT SERPL: 19.1 (ref 7–25)
C PNEUM DNA NPH QL NAA+NON-PROBE: NOT DETECTED
CALCIUM SPEC-SCNC: 9.4 MG/DL (ref 8.6–10.5)
CHLORIDE SERPL-SCNC: 102 MMOL/L (ref 98–107)
CO2 SERPL-SCNC: 26.7 MMOL/L (ref 22–29)
CREAT BLD-MCNC: 0.89 MG/DL (ref 0.76–1.27)
CRP SERPL-MCNC: 1.35 MG/DL (ref 0–0.5)
D DIMER PPP FEU-MCNC: 0.43 MCGFEU/ML (ref 0–0.5)
D-LACTATE SERPL-SCNC: 1.4 MMOL/L (ref 0.5–2)
DEPRECATED RDW RBC AUTO: 42.4 FL (ref 37–54)
EOSINOPHIL # BLD AUTO: 0.66 10*3/MM3 (ref 0–0.4)
EOSINOPHIL NFR BLD AUTO: 5.3 % (ref 0.3–6.2)
ERYTHROCYTE [DISTWIDTH] IN BLOOD BY AUTOMATED COUNT: 12.8 % (ref 12.3–15.4)
FLUAV H1 2009 PAND RNA NPH QL NAA+PROBE: NOT DETECTED
FLUAV H1 HA GENE NPH QL NAA+PROBE: NOT DETECTED
FLUAV H3 RNA NPH QL NAA+PROBE: NOT DETECTED
FLUAV SUBTYP SPEC NAA+PROBE: NOT DETECTED
FLUBV RNA ISLT QL NAA+PROBE: NOT DETECTED
GFR SERPL CREATININE-BSD FRML MDRD: 90 ML/MIN/1.73
GLOBULIN UR ELPH-MCNC: 3.1 GM/DL
GLUCOSE BLD-MCNC: 96 MG/DL (ref 65–99)
HADV DNA SPEC NAA+PROBE: NOT DETECTED
HCOV 229E RNA SPEC QL NAA+PROBE: NOT DETECTED
HCOV HKU1 RNA SPEC QL NAA+PROBE: NOT DETECTED
HCOV NL63 RNA SPEC QL NAA+PROBE: NOT DETECTED
HCOV OC43 RNA SPEC QL NAA+PROBE: NOT DETECTED
HCT VFR BLD AUTO: 40.2 % (ref 37.5–51)
HGB BLD-MCNC: 13.9 G/DL (ref 13–17.7)
HMPV RNA NPH QL NAA+NON-PROBE: NOT DETECTED
HPIV1 RNA SPEC QL NAA+PROBE: NOT DETECTED
HPIV2 RNA SPEC QL NAA+PROBE: NOT DETECTED
HPIV3 RNA NPH QL NAA+PROBE: NOT DETECTED
HPIV4 P GENE NPH QL NAA+PROBE: NOT DETECTED
IMM GRANULOCYTES # BLD AUTO: 0.03 10*3/MM3 (ref 0–0.05)
IMM GRANULOCYTES NFR BLD AUTO: 0.2 % (ref 0–0.5)
LDH SERPL-CCNC: 198 U/L (ref 135–225)
LYMPHOCYTES # BLD AUTO: 2.94 10*3/MM3 (ref 0.7–3.1)
LYMPHOCYTES NFR BLD AUTO: 23.7 % (ref 19.6–45.3)
M PNEUMO IGG SER IA-ACNC: NOT DETECTED
MCH RBC QN AUTO: 31.4 PG (ref 26.6–33)
MCHC RBC AUTO-ENTMCNC: 34.6 G/DL (ref 31.5–35.7)
MCV RBC AUTO: 90.7 FL (ref 79–97)
MONOCYTES # BLD AUTO: 1.26 10*3/MM3 (ref 0.1–0.9)
MONOCYTES NFR BLD AUTO: 10.2 % (ref 5–12)
NEUTROPHILS # BLD AUTO: 7.41 10*3/MM3 (ref 1.7–7)
NEUTROPHILS NFR BLD AUTO: 59.7 % (ref 42.7–76)
NRBC BLD AUTO-RTO: 0 /100 WBC (ref 0–0.2)
PLATELET # BLD AUTO: 227 10*3/MM3 (ref 140–450)
PMV BLD AUTO: 11.1 FL (ref 6–12)
POTASSIUM BLD-SCNC: 3.6 MMOL/L (ref 3.5–5.2)
PROCALCITONIN SERPL-MCNC: 0.06 NG/ML (ref 0.1–0.25)
PROT SERPL-MCNC: 7.2 G/DL (ref 6–8.5)
RBC # BLD AUTO: 4.43 10*6/MM3 (ref 4.14–5.8)
RHINOVIRUS RNA SPEC NAA+PROBE: NOT DETECTED
RSV RNA NPH QL NAA+NON-PROBE: NOT DETECTED
SARS-COV-2 N GENE NPH QL NAA+PROBE: NOT DETECTED
SODIUM BLD-SCNC: 140 MMOL/L (ref 136–145)
TROPONIN T SERPL-MCNC: <0.01 NG/ML (ref 0–0.03)
WBC NRBC COR # BLD: 12.41 10*3/MM3 (ref 3.4–10.8)

## 2020-06-29 PROCEDURE — 83615 LACTATE (LD) (LDH) ENZYME: CPT | Performed by: PHYSICIAN ASSISTANT

## 2020-06-29 PROCEDURE — 84484 ASSAY OF TROPONIN QUANT: CPT | Performed by: PHYSICIAN ASSISTANT

## 2020-06-29 PROCEDURE — 85379 FIBRIN DEGRADATION QUANT: CPT | Performed by: PHYSICIAN ASSISTANT

## 2020-06-29 PROCEDURE — 87635 SARS-COV-2 COVID-19 AMP PRB: CPT | Performed by: EMERGENCY MEDICINE

## 2020-06-29 PROCEDURE — 84145 PROCALCITONIN (PCT): CPT | Performed by: PHYSICIAN ASSISTANT

## 2020-06-29 PROCEDURE — 86140 C-REACTIVE PROTEIN: CPT | Performed by: PHYSICIAN ASSISTANT

## 2020-06-29 PROCEDURE — 71250 CT THORAX DX C-: CPT

## 2020-06-29 PROCEDURE — 85025 COMPLETE CBC W/AUTO DIFF WBC: CPT | Performed by: PHYSICIAN ASSISTANT

## 2020-06-29 PROCEDURE — 0099U HC BIOFIRE FILMARRAY RESP PANEL 1: CPT | Performed by: PHYSICIAN ASSISTANT

## 2020-06-29 PROCEDURE — 83605 ASSAY OF LACTIC ACID: CPT | Performed by: PHYSICIAN ASSISTANT

## 2020-06-29 PROCEDURE — 80053 COMPREHEN METABOLIC PANEL: CPT | Performed by: PHYSICIAN ASSISTANT

## 2020-06-29 PROCEDURE — 99284 EMERGENCY DEPT VISIT MOD MDM: CPT

## 2020-06-29 PROCEDURE — 71250 CT THORAX DX C-: CPT | Performed by: RADIOLOGY

## 2020-06-29 RX ORDER — SODIUM CHLORIDE 0.9 % (FLUSH) 0.9 %
10 SYRINGE (ML) INJECTION AS NEEDED
Status: DISCONTINUED | OUTPATIENT
Start: 2020-06-29 | End: 2020-06-29 | Stop reason: HOSPADM

## 2020-06-29 RX ORDER — ALBUTEROL SULFATE 90 UG/1
2 AEROSOL, METERED RESPIRATORY (INHALATION) EVERY 4 HOURS PRN
Qty: 6.7 G | Refills: 0 | Status: SHIPPED | OUTPATIENT
Start: 2020-06-29

## 2020-06-29 RX ORDER — LEVOFLOXACIN 750 MG/1
750 TABLET ORAL DAILY
Qty: 7 TABLET | Refills: 0 | Status: SHIPPED | OUTPATIENT
Start: 2020-06-29 | End: 2020-07-06 | Stop reason: HOSPADM

## 2020-06-29 RX ADMIN — SODIUM CHLORIDE 500 ML: 9 INJECTION, SOLUTION INTRAVENOUS at 09:37

## 2020-06-30 ENCOUNTER — PATIENT OUTREACH (OUTPATIENT)
Dept: CASE MANAGEMENT | Facility: OTHER | Age: 55
End: 2020-06-30

## 2020-06-30 NOTE — OUTREACH NOTE
ED Potential Covid Discharge Follow-up    RN-ACM outreach to patient.  Patient had ED visit at [facility] 06/29/20.  Negative COVID19 testing noted.  Call answered by female who reported patient was sleeping at this time.  Message left for patient to return call.     Dania Shetty RN  Ambulatory     6/30/2020, 15:22

## 2020-07-02 ENCOUNTER — HOSPITAL ENCOUNTER (INPATIENT)
Facility: HOSPITAL | Age: 55
LOS: 4 days | Discharge: HOME | End: 2020-07-06
Attending: PSYCHIATRY & NEUROLOGY | Admitting: PSYCHIATRY & NEUROLOGY

## 2020-07-02 ENCOUNTER — HOSPITAL ENCOUNTER (EMERGENCY)
Facility: HOSPITAL | Age: 55
Discharge: PSYCHIATRIC OR SUBSTANCE USE FACILITY | End: 2020-07-02
Admitting: FAMILY MEDICINE

## 2020-07-02 VITALS
HEIGHT: 70 IN | SYSTOLIC BLOOD PRESSURE: 102 MMHG | TEMPERATURE: 98.2 F | BODY MASS INDEX: 26.48 KG/M2 | HEART RATE: 108 BPM | RESPIRATION RATE: 18 BRPM | OXYGEN SATURATION: 98 % | DIASTOLIC BLOOD PRESSURE: 76 MMHG | WEIGHT: 185 LBS

## 2020-07-02 DIAGNOSIS — R45.851 SUICIDAL IDEATION: Primary | ICD-10-CM

## 2020-07-02 PROBLEM — F32.9 MDD (MAJOR DEPRESSIVE DISORDER): Status: ACTIVE | Noted: 2020-07-02

## 2020-07-02 LAB
6-ACETYL MORPHINE: NEGATIVE
ALBUMIN SERPL-MCNC: 4.04 G/DL (ref 3.5–5.2)
ALBUMIN/GLOB SERPL: 1.1 G/DL
ALP SERPL-CCNC: 75 U/L (ref 39–117)
ALT SERPL W P-5'-P-CCNC: 22 U/L (ref 1–41)
AMPHET+METHAMPHET UR QL: POSITIVE
ANION GAP SERPL CALCULATED.3IONS-SCNC: 15.8 MMOL/L (ref 5–15)
AST SERPL-CCNC: 29 U/L (ref 1–40)
BARBITURATES UR QL SCN: NEGATIVE
BASOPHILS # BLD AUTO: 0.08 10*3/MM3 (ref 0–0.2)
BASOPHILS NFR BLD AUTO: 0.8 % (ref 0–1.5)
BENZODIAZ UR QL SCN: NEGATIVE
BILIRUB SERPL-MCNC: 0.4 MG/DL (ref 0.2–1.2)
BILIRUB UR QL STRIP: ABNORMAL
BUN SERPL-MCNC: 11 MG/DL (ref 6–20)
BUN/CREAT SERPL: 9.8 (ref 7–25)
BUPRENORPHINE SERPL-MCNC: POSITIVE NG/ML
CALCIUM SPEC-SCNC: 10 MG/DL (ref 8.6–10.5)
CANNABINOIDS SERPL QL: NEGATIVE
CHLORIDE SERPL-SCNC: 99 MMOL/L (ref 98–107)
CLARITY UR: CLEAR
CO2 SERPL-SCNC: 25.2 MMOL/L (ref 22–29)
COCAINE UR QL: NEGATIVE
COLOR UR: ABNORMAL
CREAT SERPL-MCNC: 1.12 MG/DL (ref 0.76–1.27)
DEPRECATED RDW RBC AUTO: 44.6 FL (ref 37–54)
EOSINOPHIL # BLD AUTO: 0.51 10*3/MM3 (ref 0–0.4)
EOSINOPHIL NFR BLD AUTO: 5.3 % (ref 0.3–6.2)
ERYTHROCYTE [DISTWIDTH] IN BLOOD BY AUTOMATED COUNT: 13.1 % (ref 12.3–15.4)
ETHANOL BLD-MCNC: 76 MG/DL (ref 0–10)
ETHANOL UR QL: 0.08 %
GFR SERPL CREATININE-BSD FRML MDRD: 69 ML/MIN/1.73
GLOBULIN UR ELPH-MCNC: 3.6 GM/DL
GLUCOSE SERPL-MCNC: 92 MG/DL (ref 65–99)
GLUCOSE UR STRIP-MCNC: NEGATIVE MG/DL
HCT VFR BLD AUTO: 48.2 % (ref 37.5–51)
HGB BLD-MCNC: 16.2 G/DL (ref 13–17.7)
HGB UR QL STRIP.AUTO: NEGATIVE
IMM GRANULOCYTES # BLD AUTO: 0.03 10*3/MM3 (ref 0–0.05)
IMM GRANULOCYTES NFR BLD AUTO: 0.3 % (ref 0–0.5)
KETONES UR QL STRIP: ABNORMAL
LEUKOCYTE ESTERASE UR QL STRIP.AUTO: NEGATIVE
LYMPHOCYTES # BLD AUTO: 2.26 10*3/MM3 (ref 0.7–3.1)
LYMPHOCYTES NFR BLD AUTO: 23.7 % (ref 19.6–45.3)
MAGNESIUM SERPL-MCNC: 1.9 MG/DL (ref 1.6–2.6)
MCH RBC QN AUTO: 31.2 PG (ref 26.6–33)
MCHC RBC AUTO-ENTMCNC: 33.6 G/DL (ref 31.5–35.7)
MCV RBC AUTO: 92.9 FL (ref 79–97)
METHADONE UR QL SCN: NEGATIVE
MONOCYTES # BLD AUTO: 0.76 10*3/MM3 (ref 0.1–0.9)
MONOCYTES NFR BLD AUTO: 8 % (ref 5–12)
NEUTROPHILS NFR BLD AUTO: 5.91 10*3/MM3 (ref 1.7–7)
NEUTROPHILS NFR BLD AUTO: 61.9 % (ref 42.7–76)
NITRITE UR QL STRIP: NEGATIVE
NRBC BLD AUTO-RTO: 0 /100 WBC (ref 0–0.2)
OPIATES UR QL: POSITIVE
OXYCODONE UR QL SCN: NEGATIVE
PCP UR QL SCN: NEGATIVE
PH UR STRIP.AUTO: <=5 [PH] (ref 5–8)
PLAT MORPH BLD: NORMAL
PLATELET # BLD AUTO: 258 10*3/MM3 (ref 140–450)
PMV BLD AUTO: 11.1 FL (ref 6–12)
POTASSIUM SERPL-SCNC: 3.3 MMOL/L (ref 3.5–5.2)
PROT SERPL-MCNC: 7.6 G/DL (ref 6–8.5)
PROT UR QL STRIP: NEGATIVE
RBC # BLD AUTO: 5.19 10*6/MM3 (ref 4.14–5.8)
RBC MORPH BLD: NORMAL
SODIUM SERPL-SCNC: 140 MMOL/L (ref 136–145)
SP GR UR STRIP: 1.02 (ref 1–1.03)
UROBILINOGEN UR QL STRIP: ABNORMAL
WBC # BLD AUTO: 9.55 10*3/MM3 (ref 3.4–10.8)

## 2020-07-02 PROCEDURE — 85025 COMPLETE CBC W/AUTO DIFF WBC: CPT | Performed by: NURSE PRACTITIONER

## 2020-07-02 PROCEDURE — 94640 AIRWAY INHALATION TREATMENT: CPT

## 2020-07-02 PROCEDURE — 80307 DRUG TEST PRSMV CHEM ANLYZR: CPT | Performed by: NURSE PRACTITIONER

## 2020-07-02 PROCEDURE — 80053 COMPREHEN METABOLIC PANEL: CPT | Performed by: NURSE PRACTITIONER

## 2020-07-02 PROCEDURE — 83735 ASSAY OF MAGNESIUM: CPT | Performed by: NURSE PRACTITIONER

## 2020-07-02 PROCEDURE — 36415 COLL VENOUS BLD VENIPUNCTURE: CPT

## 2020-07-02 PROCEDURE — 94799 UNLISTED PULMONARY SVC/PX: CPT

## 2020-07-02 PROCEDURE — 81003 URINALYSIS AUTO W/O SCOPE: CPT | Performed by: NURSE PRACTITIONER

## 2020-07-02 PROCEDURE — 99285 EMERGENCY DEPT VISIT HI MDM: CPT

## 2020-07-02 PROCEDURE — 99284 EMERGENCY DEPT VISIT MOD MDM: CPT

## 2020-07-02 PROCEDURE — 85007 BL SMEAR W/DIFF WBC COUNT: CPT | Performed by: NURSE PRACTITIONER

## 2020-07-02 PROCEDURE — 93010 ELECTROCARDIOGRAM REPORT: CPT | Performed by: INTERNAL MEDICINE

## 2020-07-02 PROCEDURE — 93005 ELECTROCARDIOGRAM TRACING: CPT | Performed by: PSYCHIATRY & NEUROLOGY

## 2020-07-02 RX ORDER — ACETAMINOPHEN 325 MG/1
650 TABLET ORAL EVERY 6 HOURS PRN
Status: DISCONTINUED | OUTPATIENT
Start: 2020-07-02 | End: 2020-07-06 | Stop reason: HOSPADM

## 2020-07-02 RX ORDER — IPRATROPIUM BROMIDE AND ALBUTEROL SULFATE 2.5; .5 MG/3ML; MG/3ML
3 SOLUTION RESPIRATORY (INHALATION)
Status: DISCONTINUED | OUTPATIENT
Start: 2020-07-02 | End: 2020-07-03

## 2020-07-02 RX ORDER — PROPRANOLOL HYDROCHLORIDE 20 MG/1
20 TABLET ORAL EVERY 8 HOURS SCHEDULED
Status: DISCONTINUED | OUTPATIENT
Start: 2020-07-02 | End: 2020-07-03

## 2020-07-02 RX ORDER — PROPRANOLOL HYDROCHLORIDE 20 MG/1
20 TABLET ORAL 3 TIMES DAILY
COMMUNITY
End: 2020-07-06

## 2020-07-02 RX ORDER — CLONIDINE HYDROCHLORIDE 0.1 MG/1
0.1 TABLET ORAL 2 TIMES DAILY PRN
Status: ACTIVE | OUTPATIENT
Start: 2020-07-05 | End: 2020-07-06

## 2020-07-02 RX ORDER — ECHINACEA PURPUREA EXTRACT 125 MG
2 TABLET ORAL AS NEEDED
Status: DISCONTINUED | OUTPATIENT
Start: 2020-07-02 | End: 2020-07-06 | Stop reason: HOSPADM

## 2020-07-02 RX ORDER — POTASSIUM CHLORIDE 20 MEQ/1
40 TABLET, EXTENDED RELEASE ORAL ONCE
Status: COMPLETED | OUTPATIENT
Start: 2020-07-02 | End: 2020-07-02

## 2020-07-02 RX ORDER — NICOTINE 21 MG/24HR
1 PATCH, TRANSDERMAL 24 HOURS TRANSDERMAL
Status: DISCONTINUED | OUTPATIENT
Start: 2020-07-03 | End: 2020-07-06 | Stop reason: HOSPADM

## 2020-07-02 RX ORDER — CLONIDINE HYDROCHLORIDE 0.1 MG/1
0.1 TABLET ORAL DAILY PRN
Status: DISCONTINUED | OUTPATIENT
Start: 2020-07-06 | End: 2020-07-06 | Stop reason: HOSPADM

## 2020-07-02 RX ORDER — ALBUTEROL SULFATE 90 UG/1
2 AEROSOL, METERED RESPIRATORY (INHALATION) EVERY 4 HOURS PRN
Status: DISCONTINUED | OUTPATIENT
Start: 2020-07-02 | End: 2020-07-06 | Stop reason: HOSPADM

## 2020-07-02 RX ORDER — ALUMINA, MAGNESIA, AND SIMETHICONE 2400; 2400; 240 MG/30ML; MG/30ML; MG/30ML
15 SUSPENSION ORAL EVERY 6 HOURS PRN
Status: DISCONTINUED | OUTPATIENT
Start: 2020-07-02 | End: 2020-07-06 | Stop reason: HOSPADM

## 2020-07-02 RX ORDER — FAMOTIDINE 20 MG/1
20 TABLET, FILM COATED ORAL 2 TIMES DAILY PRN
Status: DISCONTINUED | OUTPATIENT
Start: 2020-07-02 | End: 2020-07-06 | Stop reason: HOSPADM

## 2020-07-02 RX ORDER — CYCLOBENZAPRINE HCL 10 MG
10 TABLET ORAL 3 TIMES DAILY PRN
Status: DISCONTINUED | OUTPATIENT
Start: 2020-07-02 | End: 2020-07-06 | Stop reason: HOSPADM

## 2020-07-02 RX ORDER — BENZTROPINE MESYLATE 1 MG/ML
1 INJECTION INTRAMUSCULAR; INTRAVENOUS ONCE AS NEEDED
Status: DISCONTINUED | OUTPATIENT
Start: 2020-07-02 | End: 2020-07-06 | Stop reason: HOSPADM

## 2020-07-02 RX ORDER — IBUPROFEN 400 MG/1
400 TABLET ORAL EVERY 6 HOURS PRN
Status: DISCONTINUED | OUTPATIENT
Start: 2020-07-02 | End: 2020-07-06 | Stop reason: HOSPADM

## 2020-07-02 RX ORDER — ONDANSETRON 4 MG/1
4 TABLET, FILM COATED ORAL EVERY 6 HOURS PRN
Status: DISCONTINUED | OUTPATIENT
Start: 2020-07-02 | End: 2020-07-06 | Stop reason: HOSPADM

## 2020-07-02 RX ORDER — LEVOFLOXACIN 750 MG/1
750 TABLET ORAL DAILY
Status: DISCONTINUED | OUTPATIENT
Start: 2020-07-03 | End: 2020-07-02 | Stop reason: ALTCHOICE

## 2020-07-02 RX ORDER — BENZTROPINE MESYLATE 1 MG/1
2 TABLET ORAL ONCE AS NEEDED
Status: DISCONTINUED | OUTPATIENT
Start: 2020-07-02 | End: 2020-07-06 | Stop reason: HOSPADM

## 2020-07-02 RX ORDER — LEVOFLOXACIN 750 MG/1
750 TABLET ORAL EVERY 24 HOURS
Status: DISCONTINUED | OUTPATIENT
Start: 2020-07-02 | End: 2020-07-06 | Stop reason: HOSPADM

## 2020-07-02 RX ORDER — CLONIDINE HYDROCHLORIDE 0.1 MG/1
0.1 TABLET ORAL 3 TIMES DAILY PRN
Status: ACTIVE | OUTPATIENT
Start: 2020-07-04 | End: 2020-07-05

## 2020-07-02 RX ORDER — TRAZODONE HYDROCHLORIDE 50 MG/1
50 TABLET ORAL NIGHTLY PRN
Status: DISCONTINUED | OUTPATIENT
Start: 2020-07-02 | End: 2020-07-06 | Stop reason: HOSPADM

## 2020-07-02 RX ORDER — HYDROXYZINE 50 MG/1
50 TABLET, FILM COATED ORAL EVERY 6 HOURS PRN
Status: DISCONTINUED | OUTPATIENT
Start: 2020-07-02 | End: 2020-07-06 | Stop reason: HOSPADM

## 2020-07-02 RX ORDER — CLONIDINE HYDROCHLORIDE 0.1 MG/1
0.1 TABLET ORAL 4 TIMES DAILY PRN
Status: ACTIVE | OUTPATIENT
Start: 2020-07-03 | End: 2020-07-04

## 2020-07-02 RX ORDER — LOPERAMIDE HYDROCHLORIDE 2 MG/1
2 CAPSULE ORAL
Status: DISCONTINUED | OUTPATIENT
Start: 2020-07-02 | End: 2020-07-06 | Stop reason: HOSPADM

## 2020-07-02 RX ORDER — BENZONATATE 100 MG/1
100 CAPSULE ORAL 3 TIMES DAILY PRN
Status: DISCONTINUED | OUTPATIENT
Start: 2020-07-02 | End: 2020-07-06 | Stop reason: HOSPADM

## 2020-07-02 RX ORDER — CLONIDINE HYDROCHLORIDE 0.1 MG/1
0.1 TABLET ORAL 4 TIMES DAILY PRN
Status: ACTIVE | OUTPATIENT
Start: 2020-07-02 | End: 2020-07-02

## 2020-07-02 RX ORDER — DICYCLOMINE HYDROCHLORIDE 10 MG/1
10 CAPSULE ORAL 3 TIMES DAILY PRN
Status: DISCONTINUED | OUTPATIENT
Start: 2020-07-02 | End: 2020-07-06 | Stop reason: HOSPADM

## 2020-07-02 RX ADMIN — IPRATROPIUM BROMIDE AND ALBUTEROL SULFATE 3 ML: .5; 3 SOLUTION RESPIRATORY (INHALATION) at 22:45

## 2020-07-02 RX ADMIN — LEVOFLOXACIN 750 MG: 750 TABLET, FILM COATED ORAL at 22:05

## 2020-07-02 RX ADMIN — POTASSIUM CHLORIDE 40 MEQ: 1500 TABLET, EXTENDED RELEASE ORAL at 18:43

## 2020-07-02 RX ADMIN — PROPRANOLOL HYDROCHLORIDE 20 MG: 20 TABLET ORAL at 22:04

## 2020-07-02 NOTE — NURSING NOTE
"Pt drug screen positive for-   amphetamine- occasional  Use snort  Opiates- \" unsure when used this\"  Buprenorphine- \" 8 mg po qd for last 2 wks\"      Pt reports \" I get Klonipin from Dr Kothari,  I ran out about a wk ago. I will talk to doctor in morning about that.\"     "

## 2020-07-02 NOTE — NURSING NOTE
[facility] Rn contacted at this time for chart review and request of bed assignment, Bed assigned to 44a

## 2020-07-02 NOTE — ED PROVIDER NOTES
"Subjective   The patient is a 54 year old male presenting to ED for complaint of suicidal ideation without specific plan and drug abuse. He says he takes methamphetamine and suboxone. He reports previous similar symptoms but not for some time. He says he does not follow with psychiatry.       History provided by:  Patient   used: No    Suicidal   Presenting symptoms: depression and suicidal thoughts    Degree of incapacity (severity):  Moderate  Onset quality:  Gradual  Timing:  Intermittent  Progression:  Waxing and waning  Chronicity:  New  Context: drug abuse    Relieved by:  Nothing  Worsened by:  Nothing  Ineffective treatments:  None tried  Associated symptoms: anxiety, feelings of worthlessness, insomnia and irritability    Risk factors: hx of mental illness        Review of Systems   Constitutional: Positive for irritability.   HENT: Negative.    Eyes: Negative.    Respiratory: Negative.    Cardiovascular: Negative.    Gastrointestinal: Negative.    Endocrine: Negative.    Genitourinary: Negative.    Musculoskeletal: Negative.    Skin: Negative.    Allergic/Immunologic: Negative.    Neurological: Negative.    Hematological: Negative.    Psychiatric/Behavioral: Positive for suicidal ideas. The patient is nervous/anxious and has insomnia.    All other systems reviewed and are negative.      Past Medical History:   Diagnosis Date   • Acid reflux    • Anxiety    • Asthma     as child   • Cirrhosis (CMS/[facility]) 2003   • Depression    • History of substance abuse (CMS/[facility])     Clean since May 31st 2012.    • Hypertension    • Migraines    • Suicide attempt (CMS/[facility])     \"Years ago, I took a bunch of pills.\"       Allergies   Allergen Reactions   • Sulfa Antibiotics Hives, Shortness Of Breath and Swelling   • Neosporin [Bacitracin-Polymyxin B] Hives   • Topamax [Topiramate] Dermatitis       Past Surgical History:   Procedure Laterality Date   • FACIAL FRACTURE SURGERY  2017       Family History "   Problem Relation Age of Onset   • Dementia Mother    • Anxiety disorder Mother    • Heart disease Father    • Anxiety disorder Maternal Grandfather    • Depression Maternal Grandfather        Social History     Socioeconomic History   • Marital status:      Spouse name: Not on file   • Number of children: Not on file   • Years of education: Not on file   • Highest education level: Not on file   Tobacco Use   • Smoking status: Former Smoker     Packs/day: 0.50     Years: 2.00     Pack years: 1.00     Types: Cigarettes     Last attempt to quit:      Years since quittin.5   • Smokeless tobacco: Current User     Types: Snuff   • Tobacco comment: 1 can per day   Substance and Sexual Activity   • Alcohol use: No     Frequency: Never     Comment: denies   • Drug use: Yes     Comment: see below   • Sexual activity: Yes     Comment: unemployed,  has 2 sons/2 daughters           Objective   Physical Exam   Constitutional: He is oriented to person, place, and time. He appears well-developed and well-nourished.   HENT:   Head: Normocephalic.   Eyes: Pupils are equal, round, and reactive to light. EOM are normal.   Neck: Normal range of motion. Neck supple.   Cardiovascular: Normal rate, regular rhythm, normal heart sounds and intact distal pulses.   Pulmonary/Chest: Effort normal and breath sounds normal.   Abdominal: Soft. Bowel sounds are normal.   Musculoskeletal: Normal range of motion.   Neurological: He is alert and oriented to person, place, and time.   Skin: Skin is warm. Capillary refill takes less than 2 seconds.   Psychiatric:   Suicidal ideation without specific plan. Flat affect. Does not make eye contact. Denies HI/AVH   Nursing note and vitals reviewed.      Procedures           ED Course                                           MDM    Final diagnoses:   Suicidal ideation            Belle Bailey, APRN  [phone]

## 2020-07-02 NOTE — NURSING NOTE
Spoke to  via phone. Intake information provided. Instructed to admit the patient. Admit orders received. SP3 routine orders, clonidine detox admission day, repeat potassium in am  RBVOx2.. Patient and ed provider made aware of plan of care. Safety precautions maintained.

## 2020-07-02 NOTE — NURSING NOTE
Full intake assessment completed awaiting Lab results.Patient presented to ED with suicidal thoughts with a plan to OD. Patient rated anxiety and depression 10/10. Patient reported stressors as ( LIFE ) Patient reported feeling hopeless, helpless, and worthless. Patient reported poor sleep and appetite. Patient denies HI AVH. Patient reported drinking two to tree times a week. However patient has 0 CIWA. Patient treated with [facility].

## 2020-07-02 NOTE — NURSING NOTE
Report received from Brian Matthews. To resume as pt primary nurse.  Introduced  Self to patient,  Updated pt on plan of care and estimated time remaining in Er.  Pt remains in treatment will continue to monitor for safety.

## 2020-07-03 LAB
GLUCOSE BLDC GLUCOMTR-MCNC: 260 MG/DL (ref 70–130)
POTASSIUM SERPL-SCNC: 4.4 MMOL/L (ref 3.5–5.2)

## 2020-07-03 PROCEDURE — 94799 UNLISTED PULMONARY SVC/PX: CPT

## 2020-07-03 PROCEDURE — 84132 ASSAY OF SERUM POTASSIUM: CPT | Performed by: PSYCHIATRY & NEUROLOGY

## 2020-07-03 PROCEDURE — 99223 1ST HOSP IP/OBS HIGH 75: CPT | Performed by: PSYCHIATRY & NEUROLOGY

## 2020-07-03 PROCEDURE — 82962 GLUCOSE BLOOD TEST: CPT

## 2020-07-03 RX ORDER — IPRATROPIUM BROMIDE AND ALBUTEROL SULFATE 2.5; .5 MG/3ML; MG/3ML
3 SOLUTION RESPIRATORY (INHALATION)
Status: DISCONTINUED | OUTPATIENT
Start: 2020-07-03 | End: 2020-07-05

## 2020-07-03 RX ORDER — ESCITALOPRAM OXALATE 10 MG/1
10 TABLET ORAL NIGHTLY
Status: DISCONTINUED | OUTPATIENT
Start: 2020-07-03 | End: 2020-07-05

## 2020-07-03 RX ORDER — PROPRANOLOL HYDROCHLORIDE 20 MG/1
10 TABLET ORAL EVERY 8 HOURS SCHEDULED
Status: DISCONTINUED | OUTPATIENT
Start: 2020-07-03 | End: 2020-07-06 | Stop reason: HOSPADM

## 2020-07-03 RX ADMIN — PROPRANOLOL HYDROCHLORIDE 10 MG: 20 TABLET ORAL at 13:49

## 2020-07-03 RX ADMIN — TRAZODONE HYDROCHLORIDE 50 MG: 50 TABLET ORAL at 21:28

## 2020-07-03 RX ADMIN — ESCITALOPRAM 10 MG: 10 TABLET, FILM COATED ORAL at 21:28

## 2020-07-03 RX ADMIN — IBUPROFEN 400 MG: 400 TABLET, FILM COATED ORAL at 13:41

## 2020-07-03 RX ADMIN — LEVOFLOXACIN 750 MG: 750 TABLET, FILM COATED ORAL at 21:28

## 2020-07-03 RX ADMIN — HYDROXYZINE HYDROCHLORIDE 50 MG: 50 TABLET ORAL at 13:42

## 2020-07-03 RX ADMIN — PROPRANOLOL HYDROCHLORIDE 10 MG: 20 TABLET ORAL at 21:28

## 2020-07-03 NOTE — PLAN OF CARE
Problem: Patient Care Overview  Goal: Plan of Care Review  Flowsheets  Taken 7/3/2020 1207 by Jennifer Vickers  Consent Given to Review Plan with: No one  Taken 7/3/2020 0752 by Bree Echols RN  Plan of Care Reviewed With: patient  Patient Agreement with Plan of Care: agrees     Problem: Patient Care Overview  Goal: Individualization and Mutuality  Flowsheets  Taken 7/3/2020 1200  Patient Personal Strengths: family/social support;expressive of emotions;expressive of needs;resilient  Patient Vulnerabilities: ineffective coping skills; chronic substance abuse  Taken 7/3/2020 1207  Patient Specific Goals (Include Timeframe): Patient will deny suicidal ideation at discharge.  Patient will identify 1 healthy coping skill prior to discharge.  How to Address Anxieties/Fears: Patient is agreeable to discuss any anxiety or fear with staff.  Patient Specific Interventions: Patient will receive daily psychiatric evaluation, 20 minutes in length; patient will be offered 1-4 individual sessions 20 to 30 minutes in length and daily groups.  Will encourage patient to consider residential treatment.  We will utilize brief therapy modality.     Problem: Patient Care Overview  Goal: Discharge Needs Assessment  Flowsheets  Taken 7/3/2020 1207 by Jennifer Vickers  Outpatient/Agency/Support Group Needs: outpatient medication management;outpatient counseling  Discharge Coordination/Progress: Patient has [employer] insurance  Transportation Anticipated: car, drives self  Anticipated Discharge Disposition: home or self-care  Current Discharge Risk: substance use/abuse;psychiatric illness  Concerns to be Addressed: mental health;suicidal;substance/tobacco abuse/use  Readmission Within the Last 30 Days: previous discharge plan unsuccessful  Patient/Family Anticipated Services at Transition: mental health services  Patient's Choice of Community Agency(s): Patient refuses referral to residential.  Patient/Family Anticipates Transition to: home  with family  Taken 7/2/2020 2025 by Elena Fisher RN  Transportation Concerns: car, none     Problem: Patient Care Overview  Goal: Interprofessional Rounds/Family Conf  Flowsheets (Taken 7/3/2020 1207)  Participants: nursing; psychiatrist  Summary: Therapist will communicate with nursing staff, psychiatry for collateral and discharge planning  Note:   Patient refuses family involvement     Problem: Overarching Goals (Adult)  Goal: Optimized Coping Skills in Response to Life Stressors  Intervention: Promote Effective Coping Strategies  Flowsheets (Taken 7/3/2020 1207)  Supportive Measures: active listening utilized; counseling provided; decision-making supported; goal setting facilitated; positive reinforcement provided; self-care encouraged; verbalization of feelings encouraged; self-responsibility promoted; self-reflection promoted; problem solving facilitated  Note:   Patient states that he enjoys only sleeping.     Problem: Overarching Goals (Adult)  Goal: Develops/Participates in Therapeutic Omer to Support Successful Transition  Intervention: Foster Therapeutic Omer  Flowsheets (Taken 7/3/2020 1207)  Trust Relationship/Rapport: care explained; choices provided; emotional support provided; empathic listening provided; reassurance provided; questions encouraged; questions answered; thoughts/feelings acknowledged     Problem: Overarching Goals (Adult)  Goal: Develops/Participates in Therapeutic Omer to Support Successful Transition  Intervention: Mutually Develop Transition Plan  Flowsheets (Taken 7/3/2020 1207)  Transition Support: community resources reviewed; follow-up care discussed  Note:   Patient refuses consent for residential.    DATA:         Therapist met individually with patient this date to introduce role and to discuss hospitalization expectations. Patient was irritable upon interview.  Has very limited insight and judgment.  Does not appear to be motivated for treatment.  At  "one point patient stated \"yall ain't going to do nothing for me here I might as well go home\".     Clinical Maneuvering/Intervention:     Therapist assisted patient in processing above session content; acknowledged and normalized patient’s thoughts, feelings, and concerns.  Discussed the therapist/patient relationship and explain the parameters and limitations of relative confidentiality.  Also discussed the importance of active participation, and honesty to the treatment process.  Encouraged the patient to discuss/vent their feelings, frustrations, and fears concerning their ongoing medical issues and validated their feelings.     Discussed the importance of finding enjoyable activities and coping skills that the patient can engage in a regular basis. Discussed healthy coping skills such as distraction, self love, grounding, thought challenges/reframing, etc.  Provided patient with list of healthy coping skills this date. Discussed the importance of medication compliance.  Praised the patient for seeking help and spent the majority of the session building rapport.       Allowed patient to freely discuss issues without interruption or judgment. Provided safe, confidential environment to facilitate the development of positive therapeutic relationship and encourage open, honest communication.      Therapist addressed discharge safety planning this date. Assisted patient in identifying risk factors which would indicate the need for higher level of care after discharge;  including thoughts to harm self or others and/or self-harming behavior. Encouraged patient to call 911, or present to the nearest emergency room should any of these events occur. Discussed crisis intervention services and means to access.  Encouraged securing any objects of harm.       Therapist completed integrated summary, treatment plan, and initiated social history this date.  Therapist is strongly encouraging family involvement in treatment.     " "  ASSESSMENT:      Mr. Brandon Dillon is a 54-year-old  male who is currently unemployed.  He is single and has his GED.    Patient presents to the emergency room with suicidal ideation with a plan to overdose.  Patient identifies \"everything\" as stressors.  Patient states \"his anxiety is killing him\".  Patient reports feeling helpless, hopeless, worthless; reports poor sleep and appetite and lack of motivation.    Patient has a long history of opiate dependence; benzo dependence and sporadic use of meth.  Patient reports drinking 2-3 times per week but his CIWA was 0 in the emergency room.   Patient was here in June, 2020 and upon discharge went to a rehab.  I asked patient if he would like to go to a rehab upon discharge his response was \"hell no\"! Stated that he is \"done with rehab and the one  he went to  after leaving here was a joke and a shit hole\".  When I asked patient about medications he discussed with the doctor he said \"he is not going to do anything for me he just wants to try me on the same stuff\".   Patient has a history of being in assisted for approximately 8 years for receiving [place].  He was released in January 2019.    Patient is refusing referral to residential.  Patient refuses family contact.  Anticipate that patient may want to leave A.     PLAN:       Patient to remain hospitalized this date.     Treatment team will focus efforts on stabilizing patient's acute symptoms while providing education on healthy coping and crisis management to reduce hospitalizations.   Patient requires daily psychiatrist evaluation and 24/7 nursing supervision to promote patient  safety.     Therapist will offer 1-4 individual sessions, 1 therapy group daily, family education, and appropriate referral.    Therapist recommends residential referral.  Patient refuses.  Encourage patient to involve his sister in treatment.  Patient refuses.     "

## 2020-07-03 NOTE — PLAN OF CARE
Patient has been in room most of the sleeping, did come out for supper, slightly agitated but cooperative.

## 2020-07-03 NOTE — H&P
Psychiatry Inpatient Initial Eval (H+P)    Clinician: Dickson Palacios MD  9:55 AM 07/03/20      HPI: 53 y/o WM presented to ED with suicidal thoughts with a plan to OD. Patient rated anxiety and depression 10/10. Patient reported stressors as ('everything' ) Patient reported feeling hopeless, helpless, and worthless. Patient reported poor sleep and appetite. Patient denies HI AVH. Patient reported drinking two to tree times a week. However patient had 0 CIWA in the ER.  He endorses depressed mood, low energy, low motivation, symptoms of anhedonia, and SI.  He also c/o high levels of anxiety.    Chart reviewed and findings incorporated into history, diagnosis, and treatment plan.    Past Psychiatric History:  Patient has had numerous prior admissions here at the [facility].  Last discharged 6.3.2020 with a diagnosis of Other recurrent depressive disorders and had presented with similar complaints.       Substance Abuse History: He has a long h/o opiate dependence, sporadic abuse of methamphetamines, and benzo dependence.      Past Medical History:   HTN  Cirrhosis  GERD  Migraines       Medications:  Hospital Medications (active)       Dose Frequency Start End    acetaminophen (TYLENOL) tablet 650 mg 650 mg Every 6 Hours PRN 7/2/2020     Admin Instructions: Discontinue if ALT / AST Elevated    Route: Oral    albuterol sulfate HFA (PROVENTIL HFA;VENTOLIN HFA;PROAIR HFA) inhaler 2 puff 2 puff Every 4 Hours PRN 7/2/2020     Admin Instructions:  Shake well.    Route: Inhalation    aluminum-magnesium hydroxide-simethicone (MAALOX MAX) 400-400-40 MG/5ML suspension 15 mL 15 mL Every 6 Hours PRN 7/2/2020     Admin Instructions: Maximum 60 mL in 24 hours.    Route: Oral    benzonatate (TESSALON) capsule 100 mg 100 mg 3 Times Daily PRN 7/2/2020     Admin Instructions: Swallow whole.  Do not crush, chew, or open capsule.    Route: Oral    benztropine (COGENTIN)  "injection 1 mg 1 mg Once As Needed 7/2/2020     Route: Intramuscular    Linked Group 1:  \"Or\" Linked Group Details        benztropine (COGENTIN) tablet 2 mg 2 mg Once As Needed 7/2/2020     Route: Oral    Linked Group 1:  \"Or\" Linked Group Details        cloNIDine (CATAPRES) tablet 0.1 mg 0.1 mg 4 Times Daily PRN 7/3/2020 7/4/2020    Admin Instructions: May Give For Any of The Following Reasons (Do NOT Give if SBP <90, DBP <60 or HR <60)<BR><BR>- Hypertension (SBP >150 or DBP >90)<BR>- COWS 15 or Greater<BR>- Tachycardia (HR >100)<BR>Caution: Look alike/sound alike drug alert.    Route: Oral    Linked Group 2:  \"Followed by\" Linked Group Details        cloNIDine (CATAPRES) tablet 0.1 mg 0.1 mg 3 Times Daily PRN 7/4/2020 7/5/2020    Admin Instructions: May Give For Any of The Following Reasons (Do NOT Give if SBP <90, DBP <60 or HR <60)<BR><BR>- Hypertension (SBP >150 or DBP >90)<BR>- COWS 15 or Greater<BR>- Tachycardia (HR >100)<BR>Caution: Look alike/sound alike drug alert.    Route: Oral    Linked Group 2:  \"Followed by\" Linked Group Details        cloNIDine (CATAPRES) tablet 0.1 mg 0.1 mg 2 Times Daily PRN 7/5/2020 7/6/2020    Admin Instructions: May Give For Any of The Following Reasons (Do NOT Give if SBP <90, DBP <60 or HR <60)<BR><BR>- Hypertension (SBP >150 or DBP >90)<BR>- COWS 15 or Greater<BR>- Tachycardia (HR >100)<BR>Caution: Look alike/sound alike drug alert.    Route: Oral    Linked Group 2:  \"Followed by\" Linked Group Details        cloNIDine (CATAPRES) tablet 0.1 mg 0.1 mg Daily PRN 7/6/2020 7/7/2020    Admin Instructions: May Give For Any of The Following Reasons (Do NOT Give if SBP <90, DBP <60 or HR <60)<BR><BR>- Hypertension (SBP >150 or DBP >90)<BR>- COWS 15 or Greater<BR>- Tachycardia (HR >100)<BR>Caution: Look alike/sound alike drug alert.    Route: Oral    Linked Group 2:  \"Followed by\" Linked Group Details        cyclobenzaprine (FLEXERIL) tablet 10 mg 10 mg 3 Times Daily PRN 7/2/2020 " 7/7/2020    Route: Oral    dicyclomine (BENTYL) capsule 10 mg 10 mg 3 Times Daily PRN 7/2/2020 7/7/2020    Route: Oral    famotidine (PEPCID) tablet 20 mg 20 mg 2 Times Daily PRN 7/2/2020     Admin Instructions: Use Only if Maalox Ineffective    Route: Oral    hydrOXYzine (ATARAX) tablet 50 mg 50 mg Every 6 Hours PRN 7/2/2020     Admin Instructions: Caution: Look alike/sound alike drug alert    Route: Oral    ibuprofen (ADVIL,MOTRIN) tablet 400 mg 400 mg Every 6 Hours PRN 7/2/2020     Admin Instructions: If given for pain, use the following pain scale:<BR>Mild Pain = Pain Score of 1-3, CPOT 1-2<BR>Moderate Pain = Pain Score of 4-6, CPOT 3-4<BR>Severe Pain = Pain Score of 7-10, CPOT 5-8    Route: Oral    ipratropium-albuterol (DUO-NEB) nebulizer solution 3 mL 3 mL Every 4 Hours - RT 7/2/2020     Route: Nebulization    levoFLOXacin (LEVAQUIN) tablet 750 mg 750 mg Every 24 Hours 7/2/2020 7/9/2020    Admin Instructions: Caution: Look alike/sound alike drug alert.  Avoid antacids/vitamins/calcium/iron.    Route: Oral    lisinopril (PRINIVIL,ZESTRIL) 20 mg, hydroCHLOROthiazide (HYDRODIURIL) 12.5 mg for ZESTORETIC 20-12.5  Daily 7/3/2020     Admin Instructions: Give both components    Route: Oral    loperamide (IMODIUM) capsule 2 mg 2 mg Every 2 Hours PRN 7/2/2020     Admin Instructions: Maximum recommended 16 mg / 24 hours.<BR>    Route: Oral    magnesium hydroxide (MILK OF MAGNESIA) suspension 2400 mg/10mL 10 mL 10 mL Daily PRN 7/2/2020     Route: Oral    nicotine (NICODERM CQ) 21 MG/24HR patch 1 patch 1 patch Every 24 Hours Scheduled 7/3/2020     Admin Instructions: Remove Daily at 2200<BR> Acutely Hazardous. Waste BOTH Residual Medication and/or Empty Package.    Route: Transdermal    ondansetron (ZOFRAN) tablet 4 mg 4 mg Every 6 Hours PRN 7/2/2020     Route: Oral    propranolol (INDERAL) tablet 20 mg 20 mg Every 8 Hours Scheduled 7/2/2020     Route: Oral    sodium chloride nasal spray 2 spray 2 spray As Needed  7/2/2020     Admin Instructions:     Route: Each Nare    traZODone (DESYREL) tablet 50 mg 50 mg Nightly PRN 7/2/2020     Admin Instructions: Take with food.<BR>Caution: Look alike/sound alike drug alert    Route: Oral           Allergies:   Allergies   Allergen Reactions   • Sulfa Antibiotics Hives, Shortness Of Breath and Swelling   • Neosporin [Bacitracin-Polymyxin B] Hives   • Topamax [Topiramate] Dermatitis        Family History: Patient states no history of significant mental illness nor substance abuse nor suicides among first-degree relatives.      Social History: Patient in alf for 8 years for receiving stolen property release January 2019.    Has lived with his sister the past 8 months after his marital separation.  Does not attend Judaism.  Patient states he plans to enter a long-term chemical dependency treatment program post detox, is considering Suboxone.      Vital signs in last 24 hours:  Temp:  [97.3 °F (36.3 °C)-99.4 °F (37.4 °C)] 97.3 °F (36.3 °C)  Heart Rate:  [] 74  Resp:  [18-20] 18  BP: ()/(63-77) 97/71    Mental Status Evaluation:  Appearance:  age appropriate   Behavior:  normal   Speech:  normal pitch and normal volume   Mood:  depressed   Affect:  mood-congruent   Thought Process:  normal   Thought Content:  normal   Sensorium:  person, place and time/date   Cognition:  grossly intact   Insight:  age appropriate   Judgment:  age appropriate     Interval Review of Systems:   General ROS: negative for - fever or malaise  Endocrine ROS: negative for - palpitations  Respiratory ROS: no cough, shortness of breath, or wheezing  Cardiovascular ROS: no chest pain or dyspnea on exertion  Gastrointestinal ROS: no abdominal pain,no black or bloody stools  Neuro: neg  Skin: neg      Physical Exam:  General Appearance:    Alert, cooperative, in no acute distress   Head:    Normocephalic, without obvious abnormality, atraumatic   Eyes:            Lids and lashes normal, conjunctivae and  sclerae normal, no   icterus, no pallor, corneas clear, PERRLA   Ears:    Ears appear intact with no abnormalities noted   Throat:   No oral lesions, no thrush, oral mucosa moist   Neck:   No adenopathy, supple, trachea midline, no thyromegaly, no     carotid bruit, no JVD   Back:     No kyphosis present, no scoliosis present, no skin lesions,       erythema or scars, no tenderness to percussion or                   palpation,   range of motion normal   Lungs:     Clear to auscultation,respirations regular, even and                   unlabored    Heart:    Regular rhythm and normal rate, normal S1 and S2, no            murmur, no gallop, no rub, no click   Breast Exam:    Deferred   Abdomen:     Normal bowel sounds, no masses, no organomegaly, soft        non-tender, non-distended, no guarding, no rebound                 tenderness   Genitalia:    Deferred   Extremities:   Moves all extremities well, no edema, no cyanosis, no              redness   Pulses:   Pulses palpable and equal bilaterally   Skin:   No bleeding, bruising or rash   Lymph nodes:   No palpable adenopathy   Neurologic:   Cranial nerves 2 - 12 grossly intact, sensation intact, DTR        present and equal bilaterally     Exam completed within view of nursing staff.            Labs:  Lab Results (all)     Procedure Component Value Units Date/Time    Potassium [[id number]]  (Normal) Collected:  07/03/20 0631    Specimen:  Blood Updated:  07/03/20 0746     Potassium 4.4 mmol/L      Comment: Specimen hemolyzed.  Results may be affected.       POC Glucose Once [[id number]]  (Abnormal) Collected:  07/03/20 0658    Specimen:  Blood Updated:  07/03/20 0705     Glucose 260 mg/dL           Imaging:  Imaging Results (All)     None                    Diagnosis:  Active Problems:    MDD (major depressive disorder)      Patient admitted for safety and stabilization and placed on the appropriate precautions. Patient to be assigned a Masters level clinical therapist  and will be provided with an opportunity to participate in individual and group therapy on the unit.    Start trial of lexapro 10mg Daily      Pneumonia  - Continue levaquin    HTN  - lisinopril, propranolol.  - He reports that his blood pressure has been running low.  Will lower his propranol home dose from 20 to 10mg, monitor and adjust medications accordingly.      Further treatment and disposition planning will be developed prospectively.

## 2020-07-03 NOTE — PLAN OF CARE
Problem: Patient Care Overview  Goal: Plan of Care Review  Flowsheets (Taken 7/3/2020 0250)  Plan of Care Reviewed With: patient  Patient Agreement with Plan of Care: agrees  Outcome Summary: New admit, oriented to unit and unit rules, care plans initiated

## 2020-07-04 PROCEDURE — 94799 UNLISTED PULMONARY SVC/PX: CPT

## 2020-07-04 PROCEDURE — 99232 SBSQ HOSP IP/OBS MODERATE 35: CPT | Performed by: PSYCHIATRY & NEUROLOGY

## 2020-07-04 RX ADMIN — DICYCLOMINE HYDROCHLORIDE 10 MG: 10 CAPSULE ORAL at 14:27

## 2020-07-04 RX ADMIN — NICOTINE 1 PATCH: 21 PATCH, EXTENDED RELEASE TRANSDERMAL at 14:01

## 2020-07-04 RX ADMIN — HYDROXYZINE HYDROCHLORIDE 50 MG: 50 TABLET ORAL at 21:17

## 2020-07-04 RX ADMIN — HYDROCHLOROTHIAZIDE: 12.5 TABLET ORAL at 08:57

## 2020-07-04 RX ADMIN — CYCLOBENZAPRINE 10 MG: 10 TABLET, FILM COATED ORAL at 14:27

## 2020-07-04 RX ADMIN — PROPRANOLOL HYDROCHLORIDE 10 MG: 20 TABLET ORAL at 14:26

## 2020-07-04 RX ADMIN — PROPRANOLOL HYDROCHLORIDE 10 MG: 20 TABLET ORAL at 21:16

## 2020-07-04 RX ADMIN — PROPRANOLOL HYDROCHLORIDE 10 MG: 20 TABLET ORAL at 08:55

## 2020-07-04 RX ADMIN — TRAZODONE HYDROCHLORIDE 50 MG: 50 TABLET ORAL at 21:17

## 2020-07-04 RX ADMIN — LEVOFLOXACIN 750 MG: 750 TABLET, FILM COATED ORAL at 21:16

## 2020-07-04 RX ADMIN — ALBUTEROL SULFATE 2 PUFF: 90 AEROSOL, METERED RESPIRATORY (INHALATION) at 14:37

## 2020-07-04 RX ADMIN — HYDROXYZINE HYDROCHLORIDE 50 MG: 50 TABLET ORAL at 14:28

## 2020-07-04 RX ADMIN — ESCITALOPRAM 10 MG: 10 TABLET, FILM COATED ORAL at 21:16

## 2020-07-04 NOTE — PLAN OF CARE
Problem: Patient Care Overview  Goal: Plan of Care Review  Outcome: Ongoing (interventions implemented as appropriate)  Flowsheets (Taken 7/4/2020 2549)  Progress: improving  Plan of Care Reviewed With: patient  Patient Agreement with Plan of Care: agrees  Note:   Alert and oriented; isolating self in his room this shift; reports good appetite and sleep; denies anxiety/depression/SI/HI/AVH; denies cravings/withdrawal symptoms; medications reviewed; will continue to monitor.

## 2020-07-04 NOTE — PROGRESS NOTES
"INPATIENT PSYCHIATRIC PROGRESS NOTE    Name:  Brandon Dillon  :  1965  MRN:  [id number]  Visit Number:  [id number]  Length of stay:  2    SUBJECTIVE  CC/Focus of Exam: depression and SI    INTERVAL HISTORY:  The patient reports he is feeling very anxious and depressed. States he has taken Lexapro and Buspar and it has helped. He agreed to increase the dose of Lexapro to 20 mg and add Buspar 10 mg tid.  Depression rating 5/10  Anxiety rating 5/10  Sleep: good  Withdrawal sx: denies  Cravin/10    Review of Systems   Constitutional: Negative.    Respiratory: Negative.    Cardiovascular: Negative.    Gastrointestinal: Negative.    Psychiatric/Behavioral: Positive for dysphoric mood. The patient is nervous/anxious.        OBJECTIVE    Temp:  [97.6 °F (36.4 °C)-99.4 °F (37.4 °C)] 98.3 °F (36.8 °C)  Heart Rate:  [68-77] 69  Resp:  [16-18] 16  BP: ()/(56-75) 97/65    MENTAL STATUS EXAM:  Appearance:Casually dressed, good hygeine.   Cooperation:Cooperative  Psychomotor: No psychomotor agitation/retardation, No EPS, No motor tics  Speech-normal rate, amount.  Mood \"depressed\"   Affect-congruent, appropriate, stable  Thought Content-goal directed, no delusional material present  Thought process-linear, organized.  Suicidality: No SI  Homicidality: No HI  Perception: No AH/VH  Insight-fair   Judgement-fair    Lab Results (last 24 hours)     ** No results found for the last 24 hours. **             Imaging Results (Last 24 Hours)     ** No results found for the last 24 hours. **             ECG/EMG Results (most recent)     Procedure Component Value Units Date/Time    ECG 12 Lead [[id number]] Collected:  20     Updated:  20 1604    Narrative:       Test Reason : Baseline Cardiac Status  Blood Pressure : **/** mmHG  Vent. Rate : 105 BPM     Atrial Rate : 105 BPM     P-R Int : 142 ms          QRS Dur : 090 ms      QT Int : 324 ms       P-R-T Axes : 022 057 072 degrees     QTc Int : 428 ms    Sinus " tachycardia  Otherwise normal ECG  When compared with ECG of 2020 21:30,  Vent. rate has increased BY  54 BPM  Confirmed by Cas Alarcon () on 7/3/2020 4:03:59 PM    Referred By:             Confirmed By:Cas Alarcon           ALLERGIES: Sulfa antibiotics; Neosporin [bacitracin-polymyxin b]; and Topamax [topiramate]      Current Facility-Administered Medications:   •  acetaminophen (TYLENOL) tablet 650 mg, 650 mg, Oral, Q6H PRN, Pete Dobson MD  •  albuterol sulfate HFA (PROVENTIL HFA;VENTOLIN HFA;PROAIR HFA) inhaler 2 puff, 2 puff, Inhalation, Q4H PRN, Pete Dobson MD, 2 puff at 20 1437  •  aluminum-magnesium hydroxide-simethicone (MAALOX MAX) 400-400-40 MG/5ML suspension 15 mL, 15 mL, Oral, Q6H PRN, Pete Dobson MD  •  benzonatate (TESSALON) capsule 100 mg, 100 mg, Oral, TID PRN, Pete Dobson MD  •  benztropine (COGENTIN) tablet 2 mg, 2 mg, Oral, Once PRN **OR** benztropine (COGENTIN) injection 1 mg, 1 mg, Intramuscular, Once PRN, Pete Dobson MD  •  [] cloNIDine (CATAPRES) tablet 0.1 mg, 0.1 mg, Oral, 4x Daily PRN **FOLLOWED BY** cloNIDine (CATAPRES) tablet 0.1 mg, 0.1 mg, Oral, TID PRN **FOLLOWED BY** [START ON 2020] cloNIDine (CATAPRES) tablet 0.1 mg, 0.1 mg, Oral, BID PRN **FOLLOWED BY** [START ON 2020] cloNIDine (CATAPRES) tablet 0.1 mg, 0.1 mg, Oral, Daily PRN, Pete Dobson MD  •  cyclobenzaprine (FLEXERIL) tablet 10 mg, 10 mg, Oral, TID PRN, Pete Dobson MD, 10 mg at 20 1427  •  dicyclomine (BENTYL) capsule 10 mg, 10 mg, Oral, TID PRN, Pete Dobson MD, 10 mg at 20 1427  •  escitalopram (LEXAPRO) tablet 10 mg, 10 mg, Oral, Nightly, Dickson Palacios MD, 10 mg at 208  •  famotidine (PEPCID) tablet 20 mg, 20 mg, Oral, BID PRN, Pete Dobson MD  •  hydrOXYzine (ATARAX) tablet 50 mg, 50 mg, Oral, Q6H PRN, Pete Dobson MD, 50 mg at 20 1428  •  ibuprofen (ADVIL,MOTRIN) tablet 400 mg, 400 mg, Oral,  Q6H PRN, Pete Dobson MD, 400 mg at 07/03/20 1341  •  ipratropium-albuterol (DUO-NEB) nebulizer solution 3 mL, 3 mL, Nebulization, Q6H - RT, Peet Dobson MD  •  levoFLOXacin (LEVAQUIN) tablet 750 mg, 750 mg, Oral, Q24H, Pete Dobson MD, 750 mg at 07/03/20 2128  •  lisinopril (PRINIVIL,ZESTRIL) 20 mg, hydroCHLOROthiazide (HYDRODIURIL) 12.5 mg for ZESTORETIC 20-12.5, , Oral, Daily, Pete Dobson MD  •  loperamide (IMODIUM) capsule 2 mg, 2 mg, Oral, Q2H PRN, Pete Dobson MD  •  magnesium hydroxide (MILK OF MAGNESIA) suspension 2400 mg/10mL 10 mL, 10 mL, Oral, Daily PRN, Pete Dobson MD  •  nicotine (NICODERM CQ) 21 MG/24HR patch 1 patch, 1 patch, Transdermal, Q24H, Pete Dobson MD, 1 patch at 07/04/20 1401  •  ondansetron (ZOFRAN) tablet 4 mg, 4 mg, Oral, Q6H PRN, Pete Dobson MD  •  propranolol (INDERAL) tablet 10 mg, 10 mg, Oral, Q8H, Dickson Palacios MD, 10 mg at 07/04/20 1426  •  sodium chloride nasal spray 2 spray, 2 spray, Each Nare, PRN, Pete Dobson MD  •  traZODone (DESYREL) tablet 50 mg, 50 mg, Oral, Nightly PRN, Pete Dobson MD, 50 mg at 07/03/20 2128    ASSESSMENT & PLAN:      MDD (major depressive disorder)  - Increase Lexapro 20 mg daily  - Start Buspar 10 mg tid      Opioid use disorder  - Clonidine detox prn, though patients denies any withdrawals.      Pneumonia  - Continue Levaquin     HTN  - Zestoretic  - Propranolol    Special precautions: Special Precautions Level 3 (q15 min checks) .    Behavioral Health Treatment Plan and Problem List: I have reviewed and approved the Behavioral Health Treatment Plan and Problem list.  The patient has had a chance to review and agrees with the treatment plan.     Clinician:  Jessica Darby MD  07/04/20  15:49

## 2020-07-04 NOTE — PLAN OF CARE
Problem: Patient Care Overview  Goal: Plan of Care Review  Outcome: Ongoing (interventions implemented as appropriate)  Flowsheets (Taken 7/4/2020 1720)  Progress: no change  Plan of Care Reviewed With: patient  Patient Agreement with Plan of Care: agrees  Note:   CLIENT STAYS TO SELF IN ROOM DURING MAJORITY OF THE SHIFT.

## 2020-07-05 PROCEDURE — 99232 SBSQ HOSP IP/OBS MODERATE 35: CPT | Performed by: PSYCHIATRY & NEUROLOGY

## 2020-07-05 PROCEDURE — 63710000001 ONDANSETRON PER 8 MG: Performed by: PSYCHIATRY & NEUROLOGY

## 2020-07-05 PROCEDURE — 94799 UNLISTED PULMONARY SVC/PX: CPT

## 2020-07-05 RX ORDER — BUSPIRONE HYDROCHLORIDE 10 MG/1
10 TABLET ORAL 3 TIMES DAILY
Status: DISCONTINUED | OUTPATIENT
Start: 2020-07-05 | End: 2020-07-06 | Stop reason: HOSPADM

## 2020-07-05 RX ORDER — ESCITALOPRAM OXALATE 10 MG/1
20 TABLET ORAL NIGHTLY
Status: DISCONTINUED | OUTPATIENT
Start: 2020-07-05 | End: 2020-07-06 | Stop reason: HOSPADM

## 2020-07-05 RX ADMIN — ONDANSETRON HYDROCHLORIDE 4 MG: 4 TABLET, FILM COATED ORAL at 11:20

## 2020-07-05 RX ADMIN — PROPRANOLOL HYDROCHLORIDE 10 MG: 20 TABLET ORAL at 21:14

## 2020-07-05 RX ADMIN — DICYCLOMINE HYDROCHLORIDE 10 MG: 10 CAPSULE ORAL at 11:20

## 2020-07-05 RX ADMIN — BUSPIRONE HYDROCHLORIDE 10 MG: 10 TABLET ORAL at 21:14

## 2020-07-05 RX ADMIN — ALBUTEROL SULFATE 2 PUFF: 90 AEROSOL, METERED RESPIRATORY (INHALATION) at 21:13

## 2020-07-05 RX ADMIN — BUSPIRONE HYDROCHLORIDE 10 MG: 10 TABLET ORAL at 16:07

## 2020-07-05 RX ADMIN — IBUPROFEN 400 MG: 400 TABLET, FILM COATED ORAL at 11:20

## 2020-07-05 RX ADMIN — ESCITALOPRAM 20 MG: 10 TABLET, FILM COATED ORAL at 21:18

## 2020-07-05 RX ADMIN — LEVOFLOXACIN 750 MG: 750 TABLET, FILM COATED ORAL at 21:14

## 2020-07-05 RX ADMIN — TRAZODONE HYDROCHLORIDE 50 MG: 50 TABLET ORAL at 21:15

## 2020-07-05 RX ADMIN — NICOTINE 1 PATCH: 21 PATCH, EXTENDED RELEASE TRANSDERMAL at 08:50

## 2020-07-05 NOTE — PROGRESS NOTES
"INPATIENT PSYCHIATRIC PROGRESS NOTE    Name:  Brandon Dillon  :  1965  MRN:  [id number]  Visit Number:  [id number]  Length of stay:  3    SUBJECTIVE  CC/Focus of Exam: depression and SI    INTERVAL HISTORY:  The patient reports he is feeling some better and the addition of buspirone has been helpful.  Depression rating 5/10  Anxiety rating 5/10  Sleep: good  Withdrawal sx: denies  Cravin/10    Review of Systems   Constitutional: Negative.    Respiratory: Negative.    Cardiovascular: Negative.    Gastrointestinal: Negative.    Psychiatric/Behavioral: Positive for dysphoric mood. The patient is nervous/anxious.        OBJECTIVE    Temp:  [97.9 °F (36.6 °C)-99.3 °F (37.4 °C)] 98 °F (36.7 °C)  Heart Rate:  [66-80] 80  Resp:  [16-18] 18  BP: ()/(54-75) 95/54    MENTAL STATUS EXAM:  Appearance:Casually dressed, good hygeine.   Cooperation:Cooperative  Psychomotor: No psychomotor agitation/retardation, No EPS, No motor tics  Speech-normal rate, amount.  Mood \"depressed\"   Affect-congruent, appropriate, stable  Thought Content-goal directed, no delusional material present  Thought process-linear, organized.  Suicidality: No SI  Homicidality: No HI  Perception: No AH/VH  Insight-fair   Judgement-fair    Lab Results (last 24 hours)     ** No results found for the last 24 hours. **             Imaging Results (Last 24 Hours)     ** No results found for the last 24 hours. **             ECG/EMG Results (most recent)     Procedure Component Value Units Date/Time    ECG 12 Lead [[id number]] Collected:  20     Updated:  20 160    Narrative:       Test Reason : Baseline Cardiac Status  Blood Pressure : **/** mmHG  Vent. Rate : 105 BPM     Atrial Rate : 105 BPM     P-R Int : 142 ms          QRS Dur : 090 ms      QT Int : 324 ms       P-R-T Axes : 022 057 072 degrees     QTc Int : 428 ms    Sinus tachycardia  Otherwise normal ECG  When compared with ECG of 2020 21:30,  Vent. rate has increased " BY  54 BPM  Confirmed by Cas Alarcon () on 7/3/2020 4:03:59 PM    Referred By:             Confirmed By:Cas Alarcon           ALLERGIES: Sulfa antibiotics; Neosporin [bacitracin-polymyxin b]; and Topamax [topiramate]      Current Facility-Administered Medications:   •  acetaminophen (TYLENOL) tablet 650 mg, 650 mg, Oral, Q6H PRN, Pete Dobson MD  •  albuterol sulfate HFA (PROVENTIL HFA;VENTOLIN HFA;PROAIR HFA) inhaler 2 puff, 2 puff, Inhalation, Q4H PRN, Pete Dobson MD, 2 puff at 20 1437  •  aluminum-magnesium hydroxide-simethicone (MAALOX MAX) 400-400-40 MG/5ML suspension 15 mL, 15 mL, Oral, Q6H PRN, Pete Dobson MD  •  benzonatate (TESSALON) capsule 100 mg, 100 mg, Oral, TID PRN, Pete Dobson MD  •  benztropine (COGENTIN) tablet 2 mg, 2 mg, Oral, Once PRN **OR** benztropine (COGENTIN) injection 1 mg, 1 mg, Intramuscular, Once PRN, Pete Dobson MD  •  busPIRone (BUSPAR) tablet 10 mg, 10 mg, Oral, TID, Jessica Darby MD, 10 mg at 20 1607  •  [] cloNIDine (CATAPRES) tablet 0.1 mg, 0.1 mg, Oral, 4x Daily PRN **FOLLOWED BY** [] cloNIDine (CATAPRES) tablet 0.1 mg, 0.1 mg, Oral, TID PRN **FOLLOWED BY** cloNIDine (CATAPRES) tablet 0.1 mg, 0.1 mg, Oral, BID PRN **FOLLOWED BY** [START ON 2020] cloNIDine (CATAPRES) tablet 0.1 mg, 0.1 mg, Oral, Daily PRN, Pete Dobson MD  •  cyclobenzaprine (FLEXERIL) tablet 10 mg, 10 mg, Oral, TID PRN, Pete Dobson MD, 10 mg at 20 1427  •  dicyclomine (BENTYL) capsule 10 mg, 10 mg, Oral, TID PRN, Pete Dobson MD, 10 mg at 20 1120  •  escitalopram (LEXAPRO) tablet 20 mg, 20 mg, Oral, Nightly, Jessica Darby MD  •  famotidine (PEPCID) tablet 20 mg, 20 mg, Oral, BID PRN, Pete Dobson MD  •  hydrOXYzine (ATARAX) tablet 50 mg, 50 mg, Oral, Q6H PRN, Pete Dobson MD, 50 mg at 20 2117  •  ibuprofen (ADVIL,MOTRIN) tablet 400 mg, 400 mg, Oral, Q6H PRN, Pete Dobson MD, 400 mg at 20  1120  •  levoFLOXacin (LEVAQUIN) tablet 750 mg, 750 mg, Oral, Q24H, Pete Dobson MD, 750 mg at 07/04/20 2116  •  lisinopril (PRINIVIL,ZESTRIL) 20 mg, hydroCHLOROthiazide (HYDRODIURIL) 12.5 mg for ZESTORETIC 20-12.5, , Oral, Daily, Pete Dobson MD  •  loperamide (IMODIUM) capsule 2 mg, 2 mg, Oral, Q2H PRN, Pete Dobson MD  •  magnesium hydroxide (MILK OF MAGNESIA) suspension 2400 mg/10mL 10 mL, 10 mL, Oral, Daily PRN, Pete Dobson MD  •  nicotine (NICODERM CQ) 21 MG/24HR patch 1 patch, 1 patch, Transdermal, Q24H, Pete Dobson MD, 1 patch at 07/05/20 0850  •  ondansetron (ZOFRAN) tablet 4 mg, 4 mg, Oral, Q6H PRN, Pete Dobson MD, 4 mg at 07/05/20 1120  •  propranolol (INDERAL) tablet 10 mg, 10 mg, Oral, Q8H, Dickson Palacios MD, 10 mg at 07/04/20 2116  •  sodium chloride nasal spray 2 spray, 2 spray, Each Nare, PRN, Pete Dobson MD  •  traZODone (DESYREL) tablet 50 mg, 50 mg, Oral, Nightly PRN, Pete Dobson MD, 50 mg at 07/04/20 2117    ASSESSMENT & PLAN:      MDD (major depressive disorder)  - Continue Lexapro 20 mg daily  - Continue Buspar 10 mg tid      Opioid use disorder  - Clonidine detox prn, though patients denies any withdrawals.      Pneumonia  - Continue Levaquin     HTN  - Zestoretic  - Propranolol    Special precautions: Special Precautions Level 3 (q15 min checks) .    Behavioral Health Treatment Plan and Problem List: I have reviewed and approved the Behavioral Health Treatment Plan and Problem list.  The patient has had a chance to review and agrees with the treatment plan.     Clinician:  Jessica Darby MD  07/05/20  16:19

## 2020-07-05 NOTE — PLAN OF CARE
Problem: Patient Care Overview  Goal: Plan of Care Review  Outcome: Ongoing (interventions implemented as appropriate)  Flowsheets (Taken 7/5/2020 1323)  Progress: improving  Plan of Care Reviewed With: patient  Patient Agreement with Plan of Care: agrees  Note:   Patient has not been out to dayroom all day and is not interacting with other patients on the unit. Patient stays to self and isolates in his room today. Patient reports anxiety and depression both at 8/10 with Stomach ache, body aches, leg cramps, and headache reported as withdrawal symptoms. Patient denies cravings. Patient appetite is good with disrupted sleep noted for last night. No acute distress noted, will continue to monitor.

## 2020-07-05 NOTE — NURSING NOTE
Respiratory discontinued patient breathing treatment due to refusal. Patient reported to staff he feels he does not need them and has an inhaler available if needed for any shortness of breath. No acute distress noted, will continue to monitor.

## 2020-07-05 NOTE — PLAN OF CARE
Problem: Patient Care Overview  Goal: Plan of Care Review  Outcome: Ongoing (interventions implemented as appropriate)  Flowsheets (Taken 7/5/2020 8293)  Progress: improving  Plan of Care Reviewed With: patient  Patient Agreement with Plan of Care: agrees  Note:   Alert and oriented; reports good appetite; difficulty falling asleep; rates anxiety and depression 6; denies SI/HI/AVH; flat; feelings of hopeless, helpless, worthless; denies cravings; w/d symptoms of stomach cramps; COWS 5; pt continue treatment for pneumonia; pt resting well throughout the night; medications reviewed will continue to monitor.

## 2020-07-06 VITALS
HEART RATE: 68 BPM | HEIGHT: 70 IN | RESPIRATION RATE: 18 BRPM | TEMPERATURE: 98.5 F | WEIGHT: 184.2 LBS | OXYGEN SATURATION: 93 % | DIASTOLIC BLOOD PRESSURE: 72 MMHG | SYSTOLIC BLOOD PRESSURE: 124 MMHG | BODY MASS INDEX: 26.37 KG/M2

## 2020-07-06 PROCEDURE — 93005 ELECTROCARDIOGRAM TRACING: CPT | Performed by: PSYCHIATRY & NEUROLOGY

## 2020-07-06 PROCEDURE — 93010 ELECTROCARDIOGRAM REPORT: CPT | Performed by: SPECIALIST

## 2020-07-06 RX ORDER — LEVOFLOXACIN 750 MG/1
750 TABLET ORAL EVERY 24 HOURS
Qty: 3 TABLET | Refills: 0 | Status: SHIPPED | OUTPATIENT
Start: 2020-07-06 | End: 2020-07-09

## 2020-07-06 RX ORDER — BUSPIRONE HYDROCHLORIDE 10 MG/1
10 TABLET ORAL 3 TIMES DAILY
Qty: 45 TABLET | Refills: 0 | Status: SHIPPED | OUTPATIENT
Start: 2020-07-06 | End: 2020-07-21

## 2020-07-06 RX ORDER — TRAZODONE HYDROCHLORIDE 50 MG/1
50 TABLET ORAL NIGHTLY PRN
Qty: 15 TABLET | Refills: 0 | Status: SHIPPED | OUTPATIENT
Start: 2020-07-06 | End: 2020-07-21

## 2020-07-06 RX ORDER — PROPRANOLOL HYDROCHLORIDE 10 MG/1
10 TABLET ORAL EVERY 8 HOURS SCHEDULED
Qty: 45 TABLET | Refills: 0 | Status: SHIPPED | OUTPATIENT
Start: 2020-07-06

## 2020-07-06 RX ORDER — ESCITALOPRAM OXALATE 20 MG/1
20 TABLET ORAL NIGHTLY
Qty: 15 TABLET | Refills: 0 | Status: SHIPPED | OUTPATIENT
Start: 2020-07-06 | End: 2020-07-21

## 2020-07-06 RX ADMIN — BUSPIRONE HYDROCHLORIDE 10 MG: 10 TABLET ORAL at 08:43

## 2020-07-06 RX ADMIN — HYDROCHLOROTHIAZIDE: 12.5 TABLET ORAL at 08:42

## 2020-07-06 RX ADMIN — PROPRANOLOL HYDROCHLORIDE 10 MG: 20 TABLET ORAL at 13:01

## 2020-07-06 RX ADMIN — NICOTINE 1 PATCH: 21 PATCH, EXTENDED RELEASE TRANSDERMAL at 08:43

## 2020-07-06 RX ADMIN — PROPRANOLOL HYDROCHLORIDE 10 MG: 20 TABLET ORAL at 05:53

## 2020-07-06 NOTE — PLAN OF CARE
Problem: Patient Care Overview  Goal: Plan of Care Review  Outcome: Ongoing (interventions implemented as appropriate)  Flowsheets (Taken 7/6/2020 1304)  Progress: improving  Plan of Care Reviewed With: patient  Patient Agreement with Plan of Care: agrees

## 2020-07-06 NOTE — NURSING NOTE
[facility]   Family Medicine  [phone]  Tuesday July 14th @ 10:45 A.M.   For medical follow-up

## 2020-07-06 NOTE — PLAN OF CARE
Problem: Patient Care Overview  Goal: Plan of Care Review  Outcome: Ongoing (interventions implemented as appropriate)  Flowsheets (Taken 7/6/2020 9807)  Progress: improving  Plan of Care Reviewed With: patient  Patient Agreement with Plan of Care: agrees  Outcome Summary: Pneumonia improving, cough has decreased, breathing has improved, denies withdraws, rates anxiety and depression a 5, denies suicidal thoughts, no changes in treatment this shift.

## 2020-07-06 NOTE — PROGRESS NOTES
Behavioral Health Discharge Summary             Please fax within 24 hours of discharge to [facility] at: [phone]      Member Name: Brandon Dillon Member ID: [id number]   Authorization Number: [id number] Phone: [phone]   Member Address: [address] [place] [address]   Discharge Date: 07/06/2020 Level of Care at Discharge:    Facility: [facility] Staff Completing Form: BRANDY BEACH RN   If the member is being discharged directly to a residential or extended care program, please specify the type below.   __Private Child-Caring Facility (PCC) Residential/Group Home   __Private Child-Caring Facility (PCC) Therapeutic Foster Care   __Residential Treatment Facility (RTF)   __Psychiatric Residential Treatment Facility (PRTF I or II)   __Long-Term Acute Inpatient Hospital Services or Extended Care Unit (ECU)   __Other (please specify):    Brief discharge summary of treatment received (for follow up by the case management team): D/C clinical with list of medications and follow up appts given to patient upon discharge.     BRIEF SUMMARY OF RECOMMENDATIONS FOR ONGOING TREATMENT     Discharged to where: Home    Discharge diagnoses: F 32.9   Axis I:    Axis II:    Axis III:    Axis IV:    Axis V:    Does the member understand his/her DX?  Yes          Medication     Dose     Schedule Supply/  Quantity  Given at Discharge RX Provided  Yes/No  If Rx Provided, Quantity RX Prior Auth Required  Yes/No Prior Auth  Completed   Buspar  10 mg  TID         Lexpro 20 mg  HS         Trazodone  50 mg  HS                                                                     Does the member understand the reason for taking these medications? Yes                                                           FOLLOW-UP APPOINTMENTS   Please schedule within 7 days of discharge and provide appointment details for all referred services.    PCP/Other Providers Involved in Treatment:    Appointment Type: OP    Provider Name: JERZY Will  Provider Phone: [phone] Appointment Date: 07/07/2020 Appointment Time:   8:00 AM     Assessment   (new to OP services)        Case Management    Is the member already enrolled in case management?  Yes/No  If yes, date the CM was notified:    If no, was the CM referral offered?  Yes/No  Accepted? Yes/No    Is the Release of Information in the chart? Yes/No:      Medication Management (for member discharged with psychiatric medications):      A&D Treatment (for member with substance abuse/   dependence in the past year):      Medical Condition (for member with a medical condition):    Other recommended treatment:    Do you have any concerns about the discharge plan?  No    If yes, explain:    Was the member involved in the discharge planning?  Yes    If no, explain:    Was a copy of the discharge plan provided to the member?  Yes    If no, explain:

## 2020-07-06 NOTE — PROGRESS NOTES
[facility]  [address] [place] [address]  Ph- [phone]  Appt date and time: Please come by and  an intake packet on   Tuesday July 7th  from 8:00-4:00PM  Once the intake packet is complete an appt will be scheduled.

## 2020-07-06 NOTE — DISCHARGE INSTR - APPOINTMENTS
[facility]  [address] [place] [address]  Ph- [phone]  Appt date and time: Please come by and  an intake packet on Tuesday-July 7th  from 8:00-4:00PM  Once the intake packet is complete an appt will be scheduled.

## 2020-07-06 NOTE — DISCHARGE SUMMARY
Date of Discharge:  7/6/2020    Presenting Problem/History of Present Illness  MDD (major depressive disorder) [F32.9]   Interval history patient was seen today.  He reports doing well, denies depression or anxiety rates both at 1, denies SI HI hallucinations or paranoia, he is well oriented.  He denies his craving for any drugs and denies any withdrawal symptoms .  He is requesting discharge says he will follow-up at [facility] in his hometown, will attend AA and NA meetings and will go home to live with his sister.  He says he has a job interview tomorrow at [employer] at 9 AM and he wants to be discharged.    Hospital Course  Patient was hospitalized on 7/2/2020 after he presented to emergency room with suicidal thoughts with plans to take an overdose.  He also reported alcohol abuse his CIWA score in the emergency room was 0.  He was placed on special precautions level 3, started on Lexapro 10 mg daily continued on lisinopril and propranolol for high blood pressure and Levaquin for pneumonia.  He was started on BuSpar 10 mg 3 times daily p.o. on 7/4/2020.  I assumed patient care on 7/6/2020 when he reported doing well as described above.  Remained very anxious for discharge, declined referral to long-term drug rehab but was agreeable for outpatient follow-up at [facility].  Plan was to discharge him after therapist and confirmed discharge disposition and outpatient psychiatric follow-up, and nurse had scheduled medical follow-up.    Procedures Performed         Consults:   Consults     No orders found from 6/3/2020 to 7/3/2020.          Pertinent Test Results: His serum alcohol level at admission was 76  Urine drug screen was positive for amphetamines buprenorphine        Discharge Disposition      Discharge Medications     Your medication list      ASK your doctor about these medications      Instructions Last Dose Given Next Dose Due   albuterol sulfate  (90 Base) MCG/ACT inhaler  Commonly known as:  PROVENTIL  HFA;VENTOLIN HFA;PROAIR HFA      Inhale 2 puffs Every 4 (Four) Hours As Needed for Wheezing.       levoFLOXacin 750 MG tablet  Commonly known as:  LEVAQUIN      Take 1 tablet by mouth Daily.       lisinopril-hydrochlorothiazide 20-12.5 MG per tablet  Commonly known as:  PRINZIDE,ZESTORETIC      Take 1 tablet by mouth Daily. Indications: High Blood Pressure Disorder       propranolol 20 MG tablet  Commonly known as:  INDERAL  Ask about: Which instructions should I use?      Take 20 mg by mouth 3 (Three) Times a Day.              Lab Results (last 24 hours)     ** No results found for the last 24 hours. **        Follow-up Appointments  No future appointments.      Discharge Diagnosis: Major depressive disorder  Opioid use disorder  Pneumonia  Hypertension                Yolanda Martinez MD  07/06/20  11:48

## 2020-07-06 NOTE — PLAN OF CARE
Problem: Patient Care Overview  Goal: Interprofessional Rounds/Family Conf  Flowsheets (Taken 7/6/2020 1201)  Participants: family;psychiatrist  Summary: Therapist staffed case with Dr Martinez and patient's sister  Note:   Dr Martinez has assessed the patient and plans to discharge him today. Patient plans to go to his sister's house at discharge. Therapist contacted the patient's sister, Griselda and conducted safety planning. Strongly encouraged that all firearms; sharps and medications be secured for safety. She verbalized understanding and stated that the patient would be safe at her house.     Problem: Overarching Goals (Adult)  Goal: Optimized Coping Skills in Response to Life Stressors  Intervention: Promote Effective Coping Strategies  Flowsheets (Taken 7/6/2020 1761 by Chris Roibson, RN)  Supportive Measures: active listening utilized;self-care encouraged;verbalization of feelings encouraged  Note:   Patient states that he enjoys being outside riding 4 wheelers; camping.     Problem: Overarching Goals (Adult)  Goal: Develops/Participates in Therapeutic [place] to Support Successful Transition  Intervention: Foster Therapeutic [place]  Flowsheets (Taken 7/6/2020 0701 by Chris Robison, RN)  Trust Relationship/Rapport: care explained;choices provided;emotional support provided;empathic listening provided;questions answered;questions encouraged;reassurance provided;thoughts/feelings acknowledged     Problem: Overarching Goals (Adult)  Goal: Develops/Participates in Therapeutic [place] to Support Successful Transition  Intervention: Mutually Develop Transition Plan  Flowsheets (Taken 7/6/2020 1201)  Transition Support: community resources reviewed;follow-up care discussed;follow-up care coordinated  Note:   Patient will need to go to [facility] to  a intake packet. Once this is complete he can be scheduled a follow up appt.    Data: Therapist met with patient who is requesting to discharge today.  Patient states that he is feeling much better. States that he plans to go to the employment office and try to get a job. 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 Patient states that he will return to his sister's house at discharge. Therapist confirmed with patient's sister that he can return to her home. She will pick him up at discharge. Therapist contacted [facility] and patient will need to go by there and  a intake packet before they can coordinate an appt.    Assessment: Patient is requesting to discharge home. Patient denies any suicidal ideation. Patient is future oriented talking about getting a job.    Plan: Patient will discharge home with sister. Will follow up at [facility].

## 2020-07-29 ENCOUNTER — HOSPITAL ENCOUNTER (EMERGENCY)
Facility: HOSPITAL | Age: 55
Discharge: HOME | End: 2020-07-29
Attending: EMERGENCY MEDICINE | Admitting: EMERGENCY MEDICINE

## 2020-07-29 VITALS
DIASTOLIC BLOOD PRESSURE: 101 MMHG | HEART RATE: 73 BPM | BODY MASS INDEX: 27.2 KG/M2 | SYSTOLIC BLOOD PRESSURE: 150 MMHG | TEMPERATURE: 98.6 F | HEIGHT: 70 IN | RESPIRATION RATE: 18 BRPM | OXYGEN SATURATION: 96 % | WEIGHT: 190 LBS

## 2020-07-29 DIAGNOSIS — L02.31 ABSCESS AND CELLULITIS OF GLUTEAL REGION: Primary | ICD-10-CM

## 2020-07-29 DIAGNOSIS — L03.317 ABSCESS AND CELLULITIS OF GLUTEAL REGION: Primary | ICD-10-CM

## 2020-07-29 PROCEDURE — 99283 EMERGENCY DEPT VISIT LOW MDM: CPT

## 2020-07-29 RX ORDER — DOXYCYCLINE HYCLATE 100 MG/1
100 TABLET, DELAYED RELEASE ORAL 2 TIMES DAILY
Qty: 14 TABLET | Refills: 0 | Status: SHIPPED | OUTPATIENT
Start: 2020-07-29

## 2020-07-29 RX ORDER — DOXYCYCLINE 100 MG/1
100 CAPSULE ORAL ONCE
Status: COMPLETED | OUTPATIENT
Start: 2020-07-29 | End: 2020-07-29

## 2020-07-29 RX ADMIN — DOXYCYCLINE 100 MG: 100 CAPSULE ORAL at 01:21

## 2021-03-15 ENCOUNTER — BULK ORDERING (OUTPATIENT)
Dept: CASE MANAGEMENT | Facility: OTHER | Age: 56
End: 2021-03-15

## 2021-03-15 DIAGNOSIS — Z23 IMMUNIZATION DUE: ICD-10-CM

## 2025-04-17 NOTE — TELEPHONE ENCOUNTER
MR grover received this morning and has been scanned into patient's chart for you to review.  Thanks!   Site closed by suture.